# Patient Record
Sex: FEMALE | Race: WHITE | NOT HISPANIC OR LATINO | Employment: UNEMPLOYED | URBAN - NONMETROPOLITAN AREA
[De-identification: names, ages, dates, MRNs, and addresses within clinical notes are randomized per-mention and may not be internally consistent; named-entity substitution may affect disease eponyms.]

---

## 2018-03-26 LAB
ALBUMIN SERPL-MCNC: 3.5 G/DL (ref 3.5–5.2)
ALP BLD-CCNC: 82 U/L (ref 35–104)
ALT SERPL-CCNC: 261 U/L (ref 5–33)
ANION GAP SERPL CALCULATED.3IONS-SCNC: 13 MMOL/L (ref 7–19)
AST SERPL-CCNC: 146 U/L (ref 5–32)
BACTERIA: ABNORMAL /HPF
BASOPHILS ABSOLUTE: 0.1 K/UL (ref 0–0.2)
BASOPHILS RELATIVE PERCENT: 1.4 % (ref 0–1)
BILIRUB SERPL-MCNC: 0.5 MG/DL (ref 0.2–1.2)
BILIRUBIN URINE: NEGATIVE
BLOOD, URINE: ABNORMAL
BUN BLDV-MCNC: 11 MG/DL (ref 6–20)
CALCIUM SERPL-MCNC: 9.6 MG/DL (ref 8.6–10)
CHLORIDE BLD-SCNC: 98 MMOL/L (ref 98–111)
CLARITY: ABNORMAL
CO2: 26 MMOL/L (ref 22–29)
COLOR: YELLOW
CREAT SERPL-MCNC: 0.6 MG/DL (ref 0.5–0.9)
CREATININE URINE: 112.4 MG/DL (ref 4.2–622)
EOSINOPHILS ABSOLUTE: 0.4 K/UL (ref 0–0.6)
EOSINOPHILS RELATIVE PERCENT: 7.5 % (ref 0–5)
EPITHELIAL CELLS, UA: 5 /HPF (ref 0–5)
GFR NON-AFRICAN AMERICAN: >60
GLUCOSE BLD-MCNC: 81 MG/DL (ref 74–109)
GLUCOSE URINE: NEGATIVE MG/DL
HCT VFR BLD CALC: 40.9 % (ref 37–47)
HEMOGLOBIN: 13 G/DL (ref 12–16)
HYALINE CASTS: 8 /HPF (ref 0–8)
KETONES, URINE: NEGATIVE MG/DL
LEUKOCYTE ESTERASE, URINE: ABNORMAL
LYMPHOCYTES ABSOLUTE: 0.6 K/UL (ref 1.1–4.5)
LYMPHOCYTES RELATIVE PERCENT: 12.4 % (ref 20–40)
MCH RBC QN AUTO: 26.7 PG (ref 27–31)
MCHC RBC AUTO-ENTMCNC: 31.8 G/DL (ref 33–37)
MCV RBC AUTO: 84 FL (ref 81–99)
MONOCYTES ABSOLUTE: 0.9 K/UL (ref 0–0.9)
MONOCYTES RELATIVE PERCENT: 17.1 % (ref 0–10)
NEUTROPHILS ABSOLUTE: 3.1 K/UL (ref 1.5–7.5)
NEUTROPHILS RELATIVE PERCENT: 61.4 % (ref 50–65)
NITRITE, URINE: NEGATIVE
PDW BLD-RTO: 13.6 % (ref 11.5–14.5)
PH UA: 6
PLATELET # BLD: 250 K/UL (ref 130–400)
PMV BLD AUTO: 9.5 FL (ref 9.4–12.3)
POTASSIUM SERPL-SCNC: 3.9 MMOL/L (ref 3.5–5)
PROTEIN PROTEIN: 49 MG/DL (ref 15–45)
PROTEIN UA: 30 MG/DL
RBC # BLD: 4.87 M/UL (ref 4.2–5.4)
RBC UA: 21 /HPF (ref 0–4)
SODIUM BLD-SCNC: 137 MMOL/L (ref 136–145)
SPECIFIC GRAVITY UA: 1.02
TOTAL PROTEIN: 7.8 G/DL (ref 6.6–8.7)
UROBILINOGEN, URINE: 0.2 E.U./DL
WBC # BLD: 5.1 K/UL (ref 4.8–10.8)
WBC UA: 12 /HPF (ref 0–5)

## 2020-05-09 ENCOUNTER — HOSPITAL ENCOUNTER (INPATIENT)
Facility: HOSPITAL | Age: 50
LOS: 1 days | Discharge: ANOTHER HOSPITAL | End: 2020-05-10
Attending: FAMILY MEDICINE | Admitting: INTERNAL MEDICINE

## 2020-05-09 DIAGNOSIS — D64.9 SYMPTOMATIC ANEMIA: ICD-10-CM

## 2020-05-09 DIAGNOSIS — I50.9 CONGESTIVE HEART FAILURE, UNSPECIFIED HF CHRONICITY, UNSPECIFIED HEART FAILURE TYPE (HCC): Primary | ICD-10-CM

## 2020-05-09 DIAGNOSIS — N17.9 AKI (ACUTE KIDNEY INJURY) (HCC): ICD-10-CM

## 2020-05-09 PROCEDURE — 99285 EMERGENCY DEPT VISIT HI MDM: CPT

## 2020-05-10 ENCOUNTER — APPOINTMENT (OUTPATIENT)
Dept: NUCLEAR MEDICINE | Facility: HOSPITAL | Age: 50
End: 2020-05-10

## 2020-05-10 ENCOUNTER — APPOINTMENT (OUTPATIENT)
Dept: CARDIOLOGY | Facility: HOSPITAL | Age: 50
End: 2020-05-10

## 2020-05-10 ENCOUNTER — APPOINTMENT (OUTPATIENT)
Dept: ULTRASOUND IMAGING | Facility: HOSPITAL | Age: 50
End: 2020-05-10

## 2020-05-10 ENCOUNTER — APPOINTMENT (OUTPATIENT)
Dept: CT IMAGING | Facility: HOSPITAL | Age: 50
End: 2020-05-10

## 2020-05-10 ENCOUNTER — APPOINTMENT (OUTPATIENT)
Dept: GENERAL RADIOLOGY | Facility: HOSPITAL | Age: 50
End: 2020-05-10

## 2020-05-10 VITALS
HEIGHT: 63 IN | DIASTOLIC BLOOD PRESSURE: 66 MMHG | BODY MASS INDEX: 33.08 KG/M2 | TEMPERATURE: 98.3 F | RESPIRATION RATE: 24 BRPM | SYSTOLIC BLOOD PRESSURE: 131 MMHG | WEIGHT: 186.7 LBS | HEART RATE: 91 BPM | OXYGEN SATURATION: 94 %

## 2020-05-10 PROBLEM — E61.1 IRON DEFICIENCY: Status: ACTIVE | Noted: 2020-05-10

## 2020-05-10 PROBLEM — A49.9 UTI (URINARY TRACT INFECTION), BACTERIAL: Status: ACTIVE | Noted: 2020-05-10

## 2020-05-10 PROBLEM — N39.0 UTI (URINARY TRACT INFECTION), BACTERIAL: Status: ACTIVE | Noted: 2020-05-10

## 2020-05-10 PROBLEM — D64.9 ANEMIA: Status: ACTIVE | Noted: 2020-05-10

## 2020-05-10 PROBLEM — B17.9 ACUTE HEPATITIS: Status: ACTIVE | Noted: 2020-05-10

## 2020-05-10 PROBLEM — F41.9 ANXIETY: Status: ACTIVE | Noted: 2020-05-10

## 2020-05-10 PROBLEM — M06.9 RHEUMATOID ARTHRITIS (HCC): Status: ACTIVE | Noted: 2020-05-10

## 2020-05-10 PROBLEM — E87.70 VOLUME OVERLOAD: Status: ACTIVE | Noted: 2020-05-10

## 2020-05-10 PROBLEM — G47.33 OBSTRUCTIVE SLEEP APNEA SYNDROME: Status: ACTIVE | Noted: 2020-05-10

## 2020-05-10 PROBLEM — G25.81 RESTLESS LEGS: Status: ACTIVE | Noted: 2020-05-10

## 2020-05-10 PROBLEM — M35.00 SJOGREN'S SYNDROME (HCC): Chronic | Status: ACTIVE | Noted: 2020-05-10

## 2020-05-10 PROBLEM — J96.01 ACUTE RESPIRATORY FAILURE WITH HYPOXIA (HCC): Status: ACTIVE | Noted: 2020-05-10

## 2020-05-10 PROBLEM — E87.6 HYPOKALEMIA: Status: ACTIVE | Noted: 2020-05-10

## 2020-05-10 PROBLEM — R60.9 EDEMA: Status: ACTIVE | Noted: 2020-05-10

## 2020-05-10 PROBLEM — I50.9 CONGESTIVE HEART FAILURE (HCC): Status: ACTIVE | Noted: 2020-05-10

## 2020-05-10 PROBLEM — N17.9 AKI (ACUTE KIDNEY INJURY) (HCC): Status: ACTIVE | Noted: 2020-05-10

## 2020-05-10 LAB
A-A DO2: 157.7 MMHG
A-A DO2: 69.1 MMHG
ABO GROUP BLD: NORMAL
ALBUMIN SERPL-MCNC: 2.7 G/DL (ref 3.5–5.2)
ALBUMIN/GLOB SERPL: 0.6 G/DL
ALP SERPL-CCNC: 69 U/L (ref 39–117)
ALT SERPL W P-5'-P-CCNC: 7 U/L (ref 1–33)
AMYLASE SERPL-CCNC: 47 U/L (ref 28–100)
ANION GAP SERPL CALCULATED.3IONS-SCNC: 19 MMOL/L (ref 5–15)
ANION GAP SERPL CALCULATED.3IONS-SCNC: 20 MMOL/L (ref 5–15)
ANION GAP SERPL CALCULATED.3IONS-SCNC: 20 MMOL/L (ref 5–15)
APTT PPP: 25.6 SECONDS (ref 24.1–35)
ARTERIAL PATENCY WRIST A: POSITIVE
ARTERIAL PATENCY WRIST A: POSITIVE
AST SERPL-CCNC: 15 U/L (ref 1–32)
ATMOSPHERIC PRESS: 755 MMHG
ATMOSPHERIC PRESS: 756 MMHG
BASE EXCESS BLDA CALC-SCNC: -2.8 MMOL/L (ref 0–2)
BASE EXCESS BLDA CALC-SCNC: -3.5 MMOL/L (ref 0–2)
BASOPHILS # BLD MANUAL: 0.16 10*3/MM3 (ref 0–0.2)
BASOPHILS NFR BLD AUTO: 1 % (ref 0–1.5)
BDY SITE: ABNORMAL
BDY SITE: ABNORMAL
BH CV ECHO MEAS - AO MAX PG (FULL): 1.3 MMHG
BH CV ECHO MEAS - AO MAX PG: 4.7 MMHG
BH CV ECHO MEAS - AO MEAN PG (FULL): 0 MMHG
BH CV ECHO MEAS - AO MEAN PG: 2 MMHG
BH CV ECHO MEAS - AO ROOT AREA (BSA CORRECTED): 1.5
BH CV ECHO MEAS - AO ROOT AREA: 6.2 CM^2
BH CV ECHO MEAS - AO ROOT DIAM: 2.8 CM
BH CV ECHO MEAS - AO V2 MAX: 108 CM/SEC
BH CV ECHO MEAS - AO V2 MEAN: 57.5 CM/SEC
BH CV ECHO MEAS - AO V2 VTI: 17.4 CM
BH CV ECHO MEAS - AVA(I,A): 3 CM^2
BH CV ECHO MEAS - AVA(I,D): 3 CM^2
BH CV ECHO MEAS - AVA(V,A): 2.9 CM^2
BH CV ECHO MEAS - AVA(V,D): 2.9 CM^2
BH CV ECHO MEAS - BSA(HAYCOCK): 2 M^2
BH CV ECHO MEAS - BSA: 1.9 M^2
BH CV ECHO MEAS - BZI_BMI: 32.9 KILOGRAMS/M^2
BH CV ECHO MEAS - BZI_METRIC_HEIGHT: 160 CM
BH CV ECHO MEAS - BZI_METRIC_WEIGHT: 84.4 KG
BH CV ECHO MEAS - EDV(CUBED): 156.6 ML
BH CV ECHO MEAS - EDV(MOD-SP4): 93.3 ML
BH CV ECHO MEAS - EDV(TEICH): 140.7 ML
BH CV ECHO MEAS - EF(CUBED): 60.3 %
BH CV ECHO MEAS - EF(MOD-SP4): 51.2 %
BH CV ECHO MEAS - EF(TEICH): 51.4 %
BH CV ECHO MEAS - ESV(CUBED): 62.1 ML
BH CV ECHO MEAS - ESV(MOD-SP4): 45.5 ML
BH CV ECHO MEAS - ESV(TEICH): 68.3 ML
BH CV ECHO MEAS - FS: 26.5 %
BH CV ECHO MEAS - IVS/LVPW: 1
BH CV ECHO MEAS - IVSD: 1.3 CM
BH CV ECHO MEAS - LA DIMENSION: 4.3 CM
BH CV ECHO MEAS - LA/AO: 1.5
BH CV ECHO MEAS - LV DIASTOLIC VOL/BSA (35-75): 49.8 ML/M^2
BH CV ECHO MEAS - LV MASS(C)D: 299.5 GRAMS
BH CV ECHO MEAS - LV MASS(C)DI: 159.7 GRAMS/M^2
BH CV ECHO MEAS - LV MAX PG: 3.4 MMHG
BH CV ECHO MEAS - LV MEAN PG: 2 MMHG
BH CV ECHO MEAS - LV SYSTOLIC VOL/BSA (12-30): 24.3 ML/M^2
BH CV ECHO MEAS - LV V1 MAX: 91.8 CM/SEC
BH CV ECHO MEAS - LV V1 MEAN: 58.4 CM/SEC
BH CV ECHO MEAS - LV V1 VTI: 14.9 CM
BH CV ECHO MEAS - LVIDD: 5.4 CM
BH CV ECHO MEAS - LVIDS: 4 CM
BH CV ECHO MEAS - LVLD AP4: 7.4 CM
BH CV ECHO MEAS - LVLS AP4: 6.7 CM
BH CV ECHO MEAS - LVOT AREA (M): 3.5 CM^2
BH CV ECHO MEAS - LVOT AREA: 3.5 CM^2
BH CV ECHO MEAS - LVOT DIAM: 2.1 CM
BH CV ECHO MEAS - LVPWD: 1.3 CM
BH CV ECHO MEAS - MR MAX PG: 136.9 MMHG
BH CV ECHO MEAS - MR MAX VEL: 585 CM/SEC
BH CV ECHO MEAS - MR MEAN PG: 89.5 MMHG
BH CV ECHO MEAS - MR MEAN VEL: 432 CM/SEC
BH CV ECHO MEAS - MR VTI: 168.5 CM
BH CV ECHO MEAS - MV A MAX VEL: 31 CM/SEC
BH CV ECHO MEAS - MV DEC TIME: 0.15 SEC
BH CV ECHO MEAS - MV E MAX VEL: 119 CM/SEC
BH CV ECHO MEAS - MV E/A: 3.8
BH CV ECHO MEAS - RVDD: 3.1 CM
BH CV ECHO MEAS - SI(AO): 57.1 ML/M^2
BH CV ECHO MEAS - SI(CUBED): 50.4 ML/M^2
BH CV ECHO MEAS - SI(LVOT): 27.5 ML/M^2
BH CV ECHO MEAS - SI(MOD-SP4): 25.5 ML/M^2
BH CV ECHO MEAS - SI(TEICH): 38.6 ML/M^2
BH CV ECHO MEAS - SV(AO): 107.1 ML
BH CV ECHO MEAS - SV(CUBED): 94.5 ML
BH CV ECHO MEAS - SV(LVOT): 51.6 ML
BH CV ECHO MEAS - SV(MOD-SP4): 47.8 ML
BH CV ECHO MEAS - SV(TEICH): 72.4 ML
BILIRUB SERPL-MCNC: 0.6 MG/DL (ref 0.2–1.2)
BLD GP AB SCN SERPL QL: NEGATIVE
BODY TEMPERATURE: 37 C
BODY TEMPERATURE: 37 C
BUN BLD-MCNC: 70 MG/DL (ref 6–20)
BUN BLD-MCNC: 71 MG/DL (ref 6–20)
BUN BLD-MCNC: 75 MG/DL (ref 6–20)
BUN/CREAT SERPL: 10.7 (ref 7–25)
BUN/CREAT SERPL: 10.8 (ref 7–25)
BUN/CREAT SERPL: 11.1 (ref 7–25)
C3 FRG RBC-MCNC: ABNORMAL
C3 SERPL-MCNC: 143 MG/DL (ref 82–167)
C4 SERPL-MCNC: 3 MG/DL (ref 14–44)
CALCIUM SPEC-SCNC: 9.1 MG/DL (ref 8.6–10.5)
CALCIUM SPEC-SCNC: 9.2 MG/DL (ref 8.6–10.5)
CALCIUM SPEC-SCNC: 9.2 MG/DL (ref 8.6–10.5)
CHLORIDE SERPL-SCNC: 96 MMOL/L (ref 98–107)
CHLORIDE SERPL-SCNC: 97 MMOL/L (ref 98–107)
CHLORIDE SERPL-SCNC: 99 MMOL/L (ref 98–107)
CHOLEST SERPL-MCNC: 191 MG/DL (ref 0–200)
CHROMATIN AB SERPL-ACNC: 55.4 IU/ML (ref 0–14)
CO2 SERPL-SCNC: 16 MMOL/L (ref 22–29)
CO2 SERPL-SCNC: 18 MMOL/L (ref 22–29)
CO2 SERPL-SCNC: 20 MMOL/L (ref 22–29)
COHGB MFR BLD: 1.4 % (ref 0–5)
COHGB MFR BLD: 12.7 % (ref 0–5)
CREAT BLD-MCNC: 6.49 MG/DL (ref 0.57–1)
CREAT BLD-MCNC: 6.62 MG/DL (ref 0.57–1)
CREAT BLD-MCNC: 6.78 MG/DL (ref 0.57–1)
CREAT UR-MCNC: 56.6 MG/DL
CRP SERPL-MCNC: 16.36 MG/DL (ref 0–0.5)
D DIMER PPP FEU-MCNC: 5.73 MG/L (FEU) (ref 0–0.5)
DEPRECATED RDW RBC AUTO: 43 FL (ref 37–54)
EOSINOPHIL # BLD MANUAL: 0.33 10*3/MM3 (ref 0–0.4)
EOSINOPHIL NFR BLD MANUAL: 2 % (ref 0.3–6.2)
EOSINOPHIL SPEC QL MICRO: 3 % EOS/100 CELLS (ref 0–0)
EPAP: 6
ERYTHROCYTE [DISTWIDTH] IN BLOOD BY AUTOMATED COUNT: 16.8 % (ref 12.3–15.4)
FERRITIN SERPL-MCNC: 256.9 NG/ML (ref 13–150)
FOLATE SERPL-MCNC: 6.33 NG/ML (ref 4.78–24.2)
GFR SERPL CREATININE-BSD FRML MDRD: 6 ML/MIN/1.73
GFR SERPL CREATININE-BSD FRML MDRD: 7 ML/MIN/1.73
GFR SERPL CREATININE-BSD FRML MDRD: 7 ML/MIN/1.73
GFR SERPL CREATININE-BSD FRML MDRD: ABNORMAL ML/MIN/{1.73_M2}
GLOBULIN UR ELPH-MCNC: 4.2 GM/DL
GLUCOSE BLD-MCNC: 116 MG/DL (ref 65–99)
GLUCOSE BLD-MCNC: 121 MG/DL (ref 65–99)
GLUCOSE BLD-MCNC: 99 MG/DL (ref 65–99)
HCO3 BLDA-SCNC: 18.8 MMOL/L (ref 20–26)
HCO3 BLDA-SCNC: 21.2 MMOL/L (ref 20–26)
HCT VFR BLD AUTO: 21.7 % (ref 34–46.6)
HCT VFR BLD AUTO: 22 % (ref 34–46.6)
HCT VFR BLD CALC: 22.8 % (ref 38–51)
HCT VFR BLD CALC: 23.2 % (ref 38–51)
HDLC SERPL-MCNC: 40 MG/DL (ref 40–60)
HGB BLD-MCNC: 7.1 G/DL (ref 12–15.9)
HGB BLD-MCNC: 7.1 G/DL (ref 12–15.9)
HGB BLDA-MCNC: 7.4 G/DL (ref 12–16)
HGB BLDA-MCNC: 7.6 G/DL (ref 12–16)
HOROWITZ INDEX BLD+IHG-RTO: 21 %
HOROWITZ INDEX BLD+IHG-RTO: 40 %
INR PPP: 1.34 (ref 0.91–1.09)
IPAP: 12
IRON 24H UR-MRATE: 29 MCG/DL (ref 37–145)
IRON SATN MFR SERPL: 9 % (ref 20–50)
LDLC SERPL CALC-MCNC: 118 MG/DL (ref 0–100)
LDLC/HDLC SERPL: 2.95 {RATIO}
LIPASE SERPL-CCNC: 43 U/L (ref 13–60)
LYMPHOCYTES # BLD MANUAL: 1.79 10*3/MM3 (ref 0.7–3.1)
LYMPHOCYTES NFR BLD MANUAL: 11 % (ref 19.6–45.3)
LYMPHOCYTES NFR BLD MANUAL: 3 % (ref 5–12)
Lab: ABNORMAL
MAGNESIUM SERPL-MCNC: 1.9 MG/DL (ref 1.6–2.6)
MAXIMAL PREDICTED HEART RATE: 171 BPM
MCH RBC QN AUTO: 23.2 PG (ref 26.6–33)
MCHC RBC AUTO-ENTMCNC: 32.3 G/DL (ref 31.5–35.7)
MCV RBC AUTO: 71.9 FL (ref 79–97)
METHGB BLD QL: 0.2 % (ref 0–3)
METHGB BLD QL: 0.3 % (ref 0–3)
MICROCYTES BLD QL: ABNORMAL
MODALITY: ABNORMAL
MODALITY: ABNORMAL
MONOCYTES # BLD AUTO: 0.49 10*3/MM3 (ref 0.1–0.9)
NEUTROPHILS # BLD AUTO: 13.36 10*3/MM3 (ref 1.7–7)
NEUTROPHILS NFR BLD MANUAL: 82 % (ref 42.7–76)
NOTE: ABNORMAL
NOTE: ABNORMAL
NOTIFIED BY: ABNORMAL
NOTIFIED WHO: ABNORMAL
NRBC SPEC MANUAL: 1 /100 WBC (ref 0–0.2)
NT-PROBNP SERPL-MCNC: ABNORMAL PG/ML (ref 5–450)
OSMOLALITY UR: 342 MOSM/KG (ref 601–850)
OXYHGB MFR BLDV: 79.9 % (ref 94–99)
OXYHGB MFR BLDV: 95.8 % (ref 94–99)
PCO2 BLDA: 23.6 MM HG (ref 35–45)
PCO2 BLDA: 32.3 MM HG (ref 35–45)
PCO2 TEMP ADJ BLD: ABNORMAL MM[HG]
PCO2 TEMP ADJ BLD: ABNORMAL MM[HG]
PH BLDA: 7.43 PH UNITS (ref 7.35–7.45)
PH BLDA: 7.51 PH UNITS (ref 7.35–7.45)
PH, TEMP CORRECTED: ABNORMAL
PH, TEMP CORRECTED: ABNORMAL
PHOSPHATE SERPL-MCNC: 5.9 MG/DL (ref 2.5–4.5)
PLAT MORPH BLD: NORMAL
PLATELET # BLD AUTO: 361 10*3/MM3 (ref 140–450)
PMV BLD AUTO: 10 FL (ref 6–12)
PO2 BLDA: 53.1 MM HG (ref 83–108)
PO2 BLDA: 90 MM HG (ref 83–108)
PO2 TEMP ADJ BLD: ABNORMAL MM[HG]
PO2 TEMP ADJ BLD: ABNORMAL MM[HG]
POLYCHROMASIA BLD QL SMEAR: ABNORMAL
POTASSIUM BLD-SCNC: 3.1 MMOL/L (ref 3.5–5.2)
POTASSIUM BLD-SCNC: 3.3 MMOL/L (ref 3.5–5.2)
POTASSIUM BLD-SCNC: 4 MMOL/L (ref 3.5–5.2)
POTASSIUM BLDA-SCNC: 3.2 MMOL/L (ref 3.5–5.2)
POTASSIUM BLDA-SCNC: 3.3 MMOL/L (ref 3.5–5.2)
PROT SERPL-MCNC: 6.9 G/DL (ref 6–8.5)
PROT UR-MCNC: 206.1 MG/DL
PROTHROMBIN TIME: 16.2 SECONDS (ref 11.9–14.6)
RBC # BLD AUTO: 3.06 10*6/MM3 (ref 3.77–5.28)
RETICS # AUTO: 0.08 10*6/MM3 (ref 0.02–0.13)
RETICS/RBC NFR AUTO: 2.51 % (ref 0.7–1.9)
RH BLD: NEGATIVE
ROULEAUX BLD QL SMEAR: ABNORMAL
SAO2 % BLDCOA: 91.8 % (ref 94–99)
SAO2 % BLDCOA: 97.5 % (ref 94–99)
SARS-COV-2 RNA RESP QL NAA+PROBE: NOT DETECTED
SET MECH RESP RATE: 16
SODIUM BLD-SCNC: 135 MMOL/L (ref 136–145)
SODIUM BLDA-SCNC: 136 MMOL/L (ref 136–145)
SODIUM BLDA-SCNC: 137 MMOL/L (ref 136–145)
SODIUM UR-SCNC: 72 MMOL/L
STRESS TARGET HR: 145 BPM
T&S EXPIRATION DATE: NORMAL
T4 FREE SERPL-MCNC: 1.2 NG/DL (ref 0.93–1.7)
TIBC SERPL-MCNC: 328 MCG/DL (ref 298–536)
TRANSFERRIN SERPL-MCNC: 220 MG/DL (ref 200–360)
TRIGL SERPL-MCNC: 165 MG/DL (ref 0–150)
TROPONIN T SERPL-MCNC: 0.12 NG/ML (ref 0–0.03)
TROPONIN T SERPL-MCNC: 0.12 NG/ML (ref 0–0.03)
TSH SERPL DL<=0.05 MIU/L-ACNC: 4.59 UIU/ML (ref 0.27–4.2)
VARIANT LYMPHS NFR BLD MANUAL: 1 % (ref 0–5)
VENTILATOR MODE: ABNORMAL
VENTILATOR MODE: ABNORMAL
VIT B12 BLD-MCNC: 1587 PG/ML (ref 211–946)
VLDLC SERPL-MCNC: 33 MG/DL
WBC MORPH BLD: NORMAL
WBC NRBC COR # BLD: 16.29 10*3/MM3 (ref 3.4–10.8)

## 2020-05-10 PROCEDURE — 82570 ASSAY OF URINE CREATININE: CPT | Performed by: INTERNAL MEDICINE

## 2020-05-10 PROCEDURE — 84466 ASSAY OF TRANSFERRIN: CPT | Performed by: INTERNAL MEDICINE

## 2020-05-10 PROCEDURE — 93306 TTE W/DOPPLER COMPLETE: CPT

## 2020-05-10 PROCEDURE — 85018 HEMOGLOBIN: CPT | Performed by: INTERNAL MEDICINE

## 2020-05-10 PROCEDURE — 93005 ELECTROCARDIOGRAM TRACING: CPT | Performed by: INTERNAL MEDICINE

## 2020-05-10 PROCEDURE — 83540 ASSAY OF IRON: CPT | Performed by: INTERNAL MEDICINE

## 2020-05-10 PROCEDURE — 83735 ASSAY OF MAGNESIUM: CPT | Performed by: INTERNAL MEDICINE

## 2020-05-10 PROCEDURE — 86060 ANTISTREPTOLYSIN O TITER: CPT | Performed by: INTERNAL MEDICINE

## 2020-05-10 PROCEDURE — 84100 ASSAY OF PHOSPHORUS: CPT | Performed by: INTERNAL MEDICINE

## 2020-05-10 PROCEDURE — 84300 ASSAY OF URINE SODIUM: CPT | Performed by: INTERNAL MEDICINE

## 2020-05-10 PROCEDURE — 94799 UNLISTED PULMONARY SVC/PX: CPT

## 2020-05-10 PROCEDURE — 93005 ELECTROCARDIOGRAM TRACING: CPT | Performed by: FAMILY MEDICINE

## 2020-05-10 PROCEDURE — 86901 BLOOD TYPING SEROLOGIC RH(D): CPT | Performed by: FAMILY MEDICINE

## 2020-05-10 PROCEDURE — 25010000002 IRON SUCROSE PER 1 MG: Performed by: INTERNAL MEDICINE

## 2020-05-10 PROCEDURE — 82150 ASSAY OF AMYLASE: CPT | Performed by: INTERNAL MEDICINE

## 2020-05-10 PROCEDURE — 25010000002 ONDANSETRON PER 1 MG: Performed by: FAMILY MEDICINE

## 2020-05-10 PROCEDURE — 86225 DNA ANTIBODY NATIVE: CPT | Performed by: INTERNAL MEDICINE

## 2020-05-10 PROCEDURE — 82805 BLOOD GASES W/O2 SATURATION: CPT

## 2020-05-10 PROCEDURE — 87635 SARS-COV-2 COVID-19 AMP PRB: CPT | Performed by: FAMILY MEDICINE

## 2020-05-10 PROCEDURE — 93970 EXTREMITY STUDY: CPT | Performed by: SURGERY

## 2020-05-10 PROCEDURE — 85007 BL SMEAR W/DIFF WBC COUNT: CPT | Performed by: FAMILY MEDICINE

## 2020-05-10 PROCEDURE — 83690 ASSAY OF LIPASE: CPT | Performed by: INTERNAL MEDICINE

## 2020-05-10 PROCEDURE — 80048 BASIC METABOLIC PNL TOTAL CA: CPT | Performed by: INTERNAL MEDICINE

## 2020-05-10 PROCEDURE — 82746 ASSAY OF FOLIC ACID SERUM: CPT | Performed by: INTERNAL MEDICINE

## 2020-05-10 PROCEDURE — 86160 COMPLEMENT ANTIGEN: CPT | Performed by: INTERNAL MEDICINE

## 2020-05-10 PROCEDURE — 84156 ASSAY OF PROTEIN URINE: CPT | Performed by: INTERNAL MEDICINE

## 2020-05-10 PROCEDURE — 71045 X-RAY EXAM CHEST 1 VIEW: CPT

## 2020-05-10 PROCEDURE — 85730 THROMBOPLASTIN TIME PARTIAL: CPT | Performed by: FAMILY MEDICINE

## 2020-05-10 PROCEDURE — 80050 GENERAL HEALTH PANEL: CPT | Performed by: FAMILY MEDICINE

## 2020-05-10 PROCEDURE — 25010000002 CEFTRIAXONE PER 250 MG: Performed by: INTERNAL MEDICINE

## 2020-05-10 PROCEDURE — 80061 LIPID PANEL: CPT | Performed by: INTERNAL MEDICINE

## 2020-05-10 PROCEDURE — 84165 PROTEIN E-PHORESIS SERUM: CPT | Performed by: INTERNAL MEDICINE

## 2020-05-10 PROCEDURE — 05HC33Z INSERTION OF INFUSION DEVICE INTO LEFT BASILIC VEIN, PERCUTANEOUS APPROACH: ICD-10-PCS | Performed by: INTERNAL MEDICINE

## 2020-05-10 PROCEDURE — 36600 WITHDRAWAL OF ARTERIAL BLOOD: CPT

## 2020-05-10 PROCEDURE — 86850 RBC ANTIBODY SCREEN: CPT | Performed by: FAMILY MEDICINE

## 2020-05-10 PROCEDURE — 76775 US EXAM ABDO BACK WALL LIM: CPT

## 2020-05-10 PROCEDURE — 71250 CT THORAX DX C-: CPT

## 2020-05-10 PROCEDURE — 84484 ASSAY OF TROPONIN QUANT: CPT | Performed by: FAMILY MEDICINE

## 2020-05-10 PROCEDURE — 85045 AUTOMATED RETICULOCYTE COUNT: CPT | Performed by: INTERNAL MEDICINE

## 2020-05-10 PROCEDURE — 86431 RHEUMATOID FACTOR QUANT: CPT | Performed by: INTERNAL MEDICINE

## 2020-05-10 PROCEDURE — 83880 ASSAY OF NATRIURETIC PEPTIDE: CPT | Performed by: FAMILY MEDICINE

## 2020-05-10 PROCEDURE — 83935 ASSAY OF URINE OSMOLALITY: CPT | Performed by: INTERNAL MEDICINE

## 2020-05-10 PROCEDURE — 25010000002 FUROSEMIDE PER 20 MG: Performed by: FAMILY MEDICINE

## 2020-05-10 PROCEDURE — 86038 ANTINUCLEAR ANTIBODIES: CPT | Performed by: INTERNAL MEDICINE

## 2020-05-10 PROCEDURE — 93010 ELECTROCARDIOGRAM REPORT: CPT | Performed by: INTERNAL MEDICINE

## 2020-05-10 PROCEDURE — 87205 SMEAR GRAM STAIN: CPT | Performed by: INTERNAL MEDICINE

## 2020-05-10 PROCEDURE — 93970 EXTREMITY STUDY: CPT

## 2020-05-10 PROCEDURE — 82728 ASSAY OF FERRITIN: CPT | Performed by: INTERNAL MEDICINE

## 2020-05-10 PROCEDURE — 94660 CPAP INITIATION&MGMT: CPT

## 2020-05-10 PROCEDURE — 83050 HGB METHEMOGLOBIN QUAN: CPT

## 2020-05-10 PROCEDURE — 85610 PROTHROMBIN TIME: CPT | Performed by: FAMILY MEDICINE

## 2020-05-10 PROCEDURE — 36410 VNPNXR 3YR/> PHY/QHP DX/THER: CPT

## 2020-05-10 PROCEDURE — 84439 ASSAY OF FREE THYROXINE: CPT | Performed by: INTERNAL MEDICINE

## 2020-05-10 PROCEDURE — 85014 HEMATOCRIT: CPT | Performed by: INTERNAL MEDICINE

## 2020-05-10 PROCEDURE — 82375 ASSAY CARBOXYHB QUANT: CPT

## 2020-05-10 PROCEDURE — 86140 C-REACTIVE PROTEIN: CPT | Performed by: INTERNAL MEDICINE

## 2020-05-10 PROCEDURE — 93306 TTE W/DOPPLER COMPLETE: CPT | Performed by: INTERNAL MEDICINE

## 2020-05-10 PROCEDURE — 86900 BLOOD TYPING SEROLOGIC ABO: CPT | Performed by: FAMILY MEDICINE

## 2020-05-10 PROCEDURE — 82607 VITAMIN B-12: CPT | Performed by: INTERNAL MEDICINE

## 2020-05-10 PROCEDURE — 83516 IMMUNOASSAY NONANTIBODY: CPT | Performed by: INTERNAL MEDICINE

## 2020-05-10 PROCEDURE — C1751 CATH, INF, PER/CENT/MIDLINE: HCPCS

## 2020-05-10 PROCEDURE — 85379 FIBRIN DEGRADATION QUANT: CPT | Performed by: FAMILY MEDICINE

## 2020-05-10 PROCEDURE — 84155 ASSAY OF PROTEIN SERUM: CPT | Performed by: INTERNAL MEDICINE

## 2020-05-10 RX ORDER — ROPINIROLE 1 MG/1
1 TABLET, FILM COATED ORAL DAILY
Status: DISCONTINUED | OUTPATIENT
Start: 2020-05-10 | End: 2020-05-10 | Stop reason: HOSPADM

## 2020-05-10 RX ORDER — LISINOPRIL 10 MG/1
10 TABLET ORAL ONCE
Status: COMPLETED | OUTPATIENT
Start: 2020-05-10 | End: 2020-05-10

## 2020-05-10 RX ORDER — ACETAMINOPHEN 325 MG/1
650 TABLET ORAL EVERY 4 HOURS PRN
Start: 2020-05-10

## 2020-05-10 RX ORDER — RIZATRIPTAN BENZOATE 10 MG/1
10 TABLET ORAL ONCE AS NEEDED
COMMUNITY
End: 2020-05-10 | Stop reason: HOSPADM

## 2020-05-10 RX ORDER — METOPROLOL SUCCINATE 50 MG/1
50 TABLET, EXTENDED RELEASE ORAL DAILY
Status: DISCONTINUED | OUTPATIENT
Start: 2020-05-10 | End: 2020-05-10 | Stop reason: HOSPADM

## 2020-05-10 RX ORDER — METOPROLOL TARTRATE 5 MG/5ML
5 INJECTION INTRAVENOUS EVERY 6 HOURS PRN
Qty: 15 ML
Start: 2020-05-10

## 2020-05-10 RX ORDER — POTASSIUM CHLORIDE 750 MG/1
40 CAPSULE, EXTENDED RELEASE ORAL ONCE
Status: COMPLETED | OUTPATIENT
Start: 2020-05-10 | End: 2020-05-10

## 2020-05-10 RX ORDER — FAMOTIDINE 40 MG/1
40 TABLET, FILM COATED ORAL 2 TIMES DAILY
COMMUNITY
End: 2020-05-10 | Stop reason: HOSPADM

## 2020-05-10 RX ORDER — METOPROLOL SUCCINATE 50 MG/1
50 TABLET, EXTENDED RELEASE ORAL DAILY
COMMUNITY

## 2020-05-10 RX ORDER — SODIUM CHLORIDE 0.9 % (FLUSH) 0.9 %
10 SYRINGE (ML) INJECTION AS NEEDED
Status: DISCONTINUED | OUTPATIENT
Start: 2020-05-10 | End: 2020-05-10 | Stop reason: HOSPADM

## 2020-05-10 RX ORDER — FUROSEMIDE 10 MG/ML
40 INJECTION INTRAMUSCULAR; INTRAVENOUS ONCE
Status: COMPLETED | OUTPATIENT
Start: 2020-05-10 | End: 2020-05-10

## 2020-05-10 RX ORDER — ONDANSETRON 2 MG/ML
4 INJECTION INTRAMUSCULAR; INTRAVENOUS EVERY 6 HOURS PRN
Start: 2020-05-10

## 2020-05-10 RX ORDER — CETIRIZINE HYDROCHLORIDE 5 MG/1
5 TABLET ORAL DAILY
Start: 2020-05-11

## 2020-05-10 RX ORDER — ACETAMINOPHEN 325 MG/1
650 TABLET ORAL EVERY 4 HOURS PRN
Status: DISCONTINUED | OUTPATIENT
Start: 2020-05-10 | End: 2020-05-10 | Stop reason: HOSPADM

## 2020-05-10 RX ORDER — FUROSEMIDE 10 MG/ML
60 INJECTION INTRAMUSCULAR; INTRAVENOUS EVERY 12 HOURS
Status: DISCONTINUED | OUTPATIENT
Start: 2020-05-10 | End: 2020-05-10

## 2020-05-10 RX ORDER — AMITRIPTYLINE HYDROCHLORIDE 25 MG/1
25 TABLET, FILM COATED ORAL DAILY
COMMUNITY

## 2020-05-10 RX ORDER — ONDANSETRON 4 MG/1
4 TABLET, FILM COATED ORAL EVERY 6 HOURS PRN
Status: DISCONTINUED | OUTPATIENT
Start: 2020-05-10 | End: 2020-05-10 | Stop reason: HOSPADM

## 2020-05-10 RX ORDER — ACETAMINOPHEN 650 MG/1
650 SUPPOSITORY RECTAL EVERY 4 HOURS PRN
Status: DISCONTINUED | OUTPATIENT
Start: 2020-05-10 | End: 2020-05-10 | Stop reason: HOSPADM

## 2020-05-10 RX ORDER — PANTOPRAZOLE SODIUM 40 MG/1
40 TABLET, DELAYED RELEASE ORAL DAILY
Start: 2020-05-10

## 2020-05-10 RX ORDER — CETIRIZINE HYDROCHLORIDE 10 MG/1
10 TABLET ORAL DAILY
COMMUNITY
End: 2020-05-10 | Stop reason: HOSPADM

## 2020-05-10 RX ORDER — ONDANSETRON 2 MG/ML
4 INJECTION INTRAMUSCULAR; INTRAVENOUS ONCE
Status: COMPLETED | OUTPATIENT
Start: 2020-05-10 | End: 2020-05-10

## 2020-05-10 RX ORDER — ROPINIROLE 1 MG/1
1 TABLET, FILM COATED ORAL DAILY
COMMUNITY

## 2020-05-10 RX ORDER — OMEPRAZOLE 20 MG/1
20 CAPSULE, DELAYED RELEASE ORAL 2 TIMES DAILY
COMMUNITY
End: 2020-05-10 | Stop reason: HOSPADM

## 2020-05-10 RX ORDER — POTASSIUM CHLORIDE 14.9 MG/ML
20 INJECTION INTRAVENOUS ONCE
Status: DISCONTINUED | OUTPATIENT
Start: 2020-05-10 | End: 2020-05-10

## 2020-05-10 RX ORDER — AMITRIPTYLINE HYDROCHLORIDE 25 MG/1
25 TABLET, FILM COATED ORAL DAILY
Status: DISCONTINUED | OUTPATIENT
Start: 2020-05-10 | End: 2020-05-10 | Stop reason: HOSPADM

## 2020-05-10 RX ORDER — TRAMADOL HYDROCHLORIDE 50 MG/1
50 TABLET ORAL EVERY 6 HOURS PRN
COMMUNITY
End: 2020-05-10 | Stop reason: HOSPADM

## 2020-05-10 RX ORDER — ONDANSETRON 4 MG/1
4 TABLET, FILM COATED ORAL DAILY PRN
COMMUNITY
End: 2020-05-10 | Stop reason: HOSPADM

## 2020-05-10 RX ORDER — PANTOPRAZOLE SODIUM 40 MG/1
40 TABLET, DELAYED RELEASE ORAL
Status: DISCONTINUED | OUTPATIENT
Start: 2020-05-10 | End: 2020-05-10 | Stop reason: HOSPADM

## 2020-05-10 RX ORDER — SODIUM CHLORIDE 0.9 % (FLUSH) 0.9 %
10 SYRINGE (ML) INJECTION EVERY 12 HOURS SCHEDULED
Status: DISCONTINUED | OUTPATIENT
Start: 2020-05-10 | End: 2020-05-10 | Stop reason: HOSPADM

## 2020-05-10 RX ORDER — LIDOCAINE HYDROCHLORIDE 10 MG/ML
1 INJECTION, SOLUTION EPIDURAL; INFILTRATION; INTRACAUDAL; PERINEURAL ONCE
Status: COMPLETED | OUTPATIENT
Start: 2020-05-10 | End: 2020-05-10

## 2020-05-10 RX ORDER — ONDANSETRON 2 MG/ML
4 INJECTION INTRAMUSCULAR; INTRAVENOUS EVERY 6 HOURS PRN
Status: DISCONTINUED | OUTPATIENT
Start: 2020-05-10 | End: 2020-05-10 | Stop reason: HOSPADM

## 2020-05-10 RX ORDER — CETIRIZINE HYDROCHLORIDE 10 MG/1
5 TABLET ORAL DAILY
Status: DISCONTINUED | OUTPATIENT
Start: 2020-05-10 | End: 2020-05-10 | Stop reason: HOSPADM

## 2020-05-10 RX ORDER — FLUTICASONE PROPIONATE 50 MCG
2 SPRAY, SUSPENSION (ML) NASAL DAILY PRN
Status: DISCONTINUED | OUTPATIENT
Start: 2020-05-10 | End: 2020-05-10 | Stop reason: HOSPADM

## 2020-05-10 RX ORDER — FLUTICASONE PROPIONATE 50 MCG
2 SPRAY, SUSPENSION (ML) NASAL DAILY PRN
COMMUNITY

## 2020-05-10 RX ORDER — METOPROLOL TARTRATE 5 MG/5ML
5 INJECTION INTRAVENOUS EVERY 6 HOURS PRN
Status: DISCONTINUED | OUTPATIENT
Start: 2020-05-10 | End: 2020-05-10 | Stop reason: HOSPADM

## 2020-05-10 RX ADMIN — IRON SUCROSE 100 MG: 20 INJECTION, SOLUTION INTRAVENOUS at 07:58

## 2020-05-10 RX ADMIN — FUROSEMIDE 40 MG: 10 INJECTION, SOLUTION INTRAMUSCULAR; INTRAVENOUS at 03:36

## 2020-05-10 RX ADMIN — ROPINIROLE HYDROCHLORIDE 1 MG: 1 TABLET, FILM COATED ORAL at 09:10

## 2020-05-10 RX ADMIN — BUMETANIDE 0.5 MG/HR: 0.25 INJECTION, SOLUTION INTRAMUSCULAR; INTRAVENOUS at 06:13

## 2020-05-10 RX ADMIN — METOPROLOL SUCCINATE 50 MG: 50 TABLET, EXTENDED RELEASE ORAL at 09:12

## 2020-05-10 RX ADMIN — POTASSIUM CHLORIDE 40 MEQ: 750 CAPSULE, EXTENDED RELEASE ORAL at 03:47

## 2020-05-10 RX ADMIN — PANTOPRAZOLE SODIUM 40 MG: 40 TABLET, DELAYED RELEASE ORAL at 06:12

## 2020-05-10 RX ADMIN — LIDOCAINE HYDROCHLORIDE ANHYDROUS 1 ML: 10 INJECTION, SOLUTION INFILTRATION at 09:33

## 2020-05-10 RX ADMIN — CEFTRIAXONE SODIUM 1 G: 1 INJECTION, POWDER, FOR SOLUTION INTRAMUSCULAR; INTRAVENOUS at 04:48

## 2020-05-10 RX ADMIN — METOPROLOL TARTRATE 5 MG: 5 INJECTION, SOLUTION INTRAVENOUS at 04:41

## 2020-05-10 RX ADMIN — CETIRIZINE HYDROCHLORIDE 5 MG: 10 TABLET, FILM COATED ORAL at 09:10

## 2020-05-10 RX ADMIN — AMITRIPTYLINE HYDROCHLORIDE 25 MG: 25 TABLET, FILM COATED ORAL at 09:10

## 2020-05-10 RX ADMIN — ONDANSETRON HYDROCHLORIDE 4 MG: 2 SOLUTION INTRAMUSCULAR; INTRAVENOUS at 01:25

## 2020-05-10 RX ADMIN — Medication 10 ML: at 09:41

## 2020-05-10 RX ADMIN — LISINOPRIL 10 MG: 10 TABLET ORAL at 09:40

## 2020-05-10 NOTE — ED PROVIDER NOTES
Subjective   Ms. Shook is a 49-year-old female with a history significant for Sjogren's syndrome, reasonably well-controlled hypertension and GERD.  Over the last 4-weeks she has become increasingly ill.  She describes nausea and vomiting that is now persistent such that she finds it difficult to keep anything down.  She had epigastric pain that was ultimately investigated by her primary care physician with a referral to GI for an upper endoscopy and colonoscopy.  She also describes low-grade fevers on and off especially in the initial part of the illness.  She also describes weight loss of 10 to 15 pounds over the last few weeks.  She presented to [facility] because of increasing shortness of breath and general malaise.          Review of Systems   Constitutional: Positive for fatigue and unexpected weight change.   Respiratory: Positive for shortness of breath.    Gastrointestinal: Positive for nausea and vomiting.   All other systems reviewed and are negative.      Past Medical History:   Diagnosis Date   • GERD (gastroesophageal reflux disease)    • Hypertension    • Sjogren's syndrome (CMS/HCC)        No Known Allergies    History reviewed. No pertinent surgical history.    History reviewed. No pertinent family history.    Social History     Socioeconomic History   • Marital status: Single     Spouse name: Not on file   • Number of children: Not on file   • Years of education: Not on file   • Highest education level: Not on file   Tobacco Use   • Smoking status: Never Smoker   Substance and Sexual Activity   • Alcohol use: Never     Frequency: Never   • Drug use: Never   • Sexual activity: Defer           Objective   Physical Exam   Constitutional: She is oriented to person, place, and time. She appears well-developed and well-nourished. She appears distressed.   HENT:   Head: Normocephalic and atraumatic.   Mouth/Throat: Oropharynx is clear and moist.   Eyes: Conjunctivae and EOM are normal.   Neck:  Normal range of motion. Neck supple.   Cardiovascular: Regular rhythm, normal heart sounds and intact distal pulses. Tachycardia present.   Pulmonary/Chest: She is in respiratory distress. She has rales.   Abdominal: Soft. Bowel sounds are normal.   Musculoskeletal: Normal range of motion. She exhibits edema.   Neurological: She is alert and oriented to person, place, and time.   Skin: Skin is warm and dry. Capillary refill takes less than 2 seconds.   Psychiatric: She has a normal mood and affect. Her behavior is normal. Judgment and thought content normal.   Nursing note and vitals reviewed.      Procedures           ED Course                                           MDM  Number of Diagnoses or Management Options     Amount and/or Complexity of Data Reviewed  Clinical lab tests: reviewed and ordered  Tests in the radiology section of CPT®: ordered and reviewed  Tests in the medicine section of CPT®: reviewed and ordered    Critical Care  Total time providing critical care: < 30 minutes    Patient Progress  Patient progress: stable      Final diagnoses:   Congestive heart failure, unspecified HF chronicity, unspecified heart failure type (CMS/HCC)   HALI (acute kidney injury) (CMS/HCC)   Symptomatic anemia     The patient was transferred over to our institution ostensibly for because of a positive troponin.  The problems however extend far beyond that.  She is in florid heart failure with a BNP over 70,000, a chest CT that strongly suggest fluid overload and dyspnea that clinically reflects it.  The patient improved quite dramatically with BiPAP.  I also ordered IV Lasix with potassium supplementation (her potassium is 3.1).    The patient has significant renal failure with a creatinine over 6.  I consulted nephrology but the PA is unable to contact the nephrologist on-call currently.  I believe the patient does need urgent dialysis and that this would be the best resolution for her fluid overload.    The patient  is also significantly anemic with a hemoglobin of 7.1 and requires a transfusion once her fluid overload status has been stabilized.    I discussed the case with Dr. Solo and we agree that this patient needs admission to the ICU.  He kindly agreed to accept the admission under his care.  The patient is currently improved but still guarded.       Jori Novak MD  05/10/20 0253

## 2020-05-10 NOTE — DISCHARGE SUMMARY
[facility] Medicine Services  TRANSFER SUMMARY       Accepting Facility: [facility]  Accepting Physician: Dr. Naeem Magallanes on behalf of Dr. Ovidio Jean Baptiste.    Date of Admission: 5/9/2020  Date of Discharge:  5/10/2020  Primary Care Physician: Annalee Saenz MD    Presenting Problem/History of Present Illness:  Shortness of breath, decreased urine output.     Final Discharge Diagnoses:  Active Hospital Problems    Diagnosis   • **Acute respiratory failure with hypoxia (CMS/HCC)   • Volume overload   • Congestive heart failure (CMS/HCC)   • HALI (acute kidney injury) (CMS/HCC)   • Edema   • Rheumatoid arthritis (CMS/HCC)   • Sjogren's syndrome (CMS/HCC)   • Anemia   • UTI (urinary tract infection), bacterial   • Hypokalemia     Consults: Dr. Poe with nephrology. Other consults were planned, but cancelled due to transfer to [place].     Procedures Performed: None.     Pertinent Test Results:   Results for orders placed during the hospital encounter of 05/09/20   STAT Adult Transthoracic Echo Complete W/ Cont if Necessary Per Protocol    Narrative · Left ventricular systolic function is mildly decreased. EF 45-50%.  · Left ventricular wall thickness is consistent with mild concentric   hypertrophy.  · Mild to moderate aortic valve regurgitation is present.  · Mild-to-moderate mitral valve regurgitation is present  · Left atrial cavity size is dilated.  · Normal size and function of the right ventricle.  · There is a small (<1cm) pericardial effusion without echocardiographic   features of tamponade physiology.        Imaging Results (Last 7 Days)     Procedure Component Value Units Date/Time    US Venous Doppler Lower Extremity Bilateral (duplex) [[id number]] Collected:  05/10/20 1031     Updated:  05/10/20 1034    Narrative:       History: Swelling       Impression:       Impression: There is no evidence of deep venous thrombosis or  superficial  thrombophlebitis of right or left lower extremities.     Comments: Bilateral lower extremity venous duplex exam was performed  using color Doppler flow, Doppler waveform analysis, and grayscale  imaging, with and without compression. There is no evidence of deep  venous thrombosis in the common femoral, superficial femoral, popliteal,  peroneal, anterior tibial, and posterior tibial veins bilaterally. No  thrombus is identified in the saphenofemoral junctions and greater  saphenous veins bilaterally.         This report was finalized on 05/10/2020 10:31 by Dr. Dwight Espinoza MD.    US Renal Bilateral [[id number]] Collected:  05/10/20 1029     Updated:  05/10/20 1033    Narrative:       EXAMINATION:  US RENAL BILATERAL-  5/10/2020 9:50 AM CDT     HISTORY: Acute renal insufficiency. N 17.9.      COMPARISON: No comparison study.     TECHNIQUE: Grayscale and color flow imaging were performed.     FINDINGS: The visualized abdominal aorta and inferior vena cava are  normal caliber. The right kidney measures 11 cm and the left kidney  measures 10.9 cm. The cortical thickness and echogenicity are within  normal limits. There is no hydronephrosis. The urinary bladder is  decompressed by Joseph catheter.       Impression:       Bilateral renal ultrasound is within normal limits.  This report was finalized on 05/10/2020 10:30 by Dr. Stanley Brewer MD.    XR Chest 1 View [[id number]] Collected:  05/10/20 0812     Updated:  05/10/20 0816    Narrative:       EXAMINATION:  XR CHEST 1 VW-  5/10/2020 7:38 AM CDT     HISTORY: Shortness of air. I50.9-Heart failure, unspecified; N17.9-Acute  kidney failure, unspecified; D64.9-Anemia, unspecified.     COMPARISON: No comparison study.     FINDINGS:  There is cardiomegaly. There are bilateral infiltrates. There  is mild blunting of the costophrenic angles.       Impression:       1. Probable congestive heart failure.  2. Parenchymal infiltrates likely represent pulmonary edema.  The  infiltrates can also be related to infection. Correlate clinically.        This report was finalized on 05/10/2020 08:13 by Dr. Stanley Brewer MD.    CT Chest Without Contrast [[id number]] Collected:  05/10/20 0805     Updated:  05/10/20 0815    Narrative:       EXAMINATION:  CT CHEST WO CONTRAST-  5/10/2020 1:11 AM CDT     HISTORY: Severe SOB. I50.9-Heart failure, unspecified; N17.9-Acute  kidney failure, unspecified; D64.9-Anemia, unspecified.     COMPARISON : No comparison study.     DLP: 393 mGy-cm. Automated dosage control was utilized.     TECHNIQUE: Spiral CT was performed of the chest without contrast.  Sagittal and coronal images were reconstructed.     MEDIASTINUM, HEART AND VASCULAR STRUCTURES: The thoracic aorta is normal  in caliber. The main pulmonary artery segment measures 3.3 cm. There is  cardiomegaly. There is a small to moderate pericardial effusion. There  is mediastinal lymphadenopathy. Right paratracheal lymph node measures  1.9 x 1.7 cm. There are multiple other smaller mediastinal lymph nodes.  There is probable hilar adenopathy but the hilar areas are difficult to  evaluate without contrast.     LUNGS: There are small bilateral pleural effusions. There are bilateral  interstitial as well as alveolar infiltrates.     UPPER ABDOMEN: No acute appearing abnormality in the upper abdomen.     BONES: There are degenerative changes of the spine.       Impression:       1. Cardiomegaly with small to moderate pericardial effusion. Small  bilateral pleural effusions. Interstitial and alveolar infiltrates.  Congestive heart failure most likely. Infectious disease of the lungs  cannot be entirely ruled out.  2. Mediastinal and hilar lymphadenopathy can be seen in patients with  congestive failure. The differential includes infection and neoplasm.  3. Dilated pulmonary arteries.          This report was finalized on 05/10/2020 08:12 by Dr. Stanley Brewer MD.        Lab Results (last 7 days)      Procedure Component Value Units Date/Time    Eosinophil Smear - Urine, Urine, Clean Catch [[id number]] Collected:  05/10/20 0750    Specimen:  Urine, Clean Catch Updated:  05/10/20 0912    Osmolality, Urine - Urine, Clean Catch [[id number]]  (Abnormal) Collected:  05/10/20 0641    Specimen:  Urine, Clean Catch Updated:  05/10/20 0755     Osmolality, Urine 342 mOsm/kg     Protein, Urine, Random - Urine, Clean Catch [[id number]] Collected:  05/10/20 0641    Specimen:  Urine, Clean Catch Updated:  05/10/20 0725     Total Protein, Urine 206.1 mg/dL     Narrative:       Reference intervals for random urine have not been established.  Clinical usage is dependent upon physician's interpretation in combination with other laboratory tests.       Sodium, Urine, Random - Urine, Clean Catch [[id number]] Collected:  05/10/20 0641    Specimen:  Urine, Clean Catch Updated:  05/10/20 0714     Sodium, Urine 72 mmol/L     Narrative:       Reference intervals for random urine have not been established.  Clinical usage is dependent upon physician's interpretation in combination with other laboratory tests.       Creatinine, Urine, Random - Urine, Clean Catch [[id number]] Collected:  05/10/20 0642    Specimen:  Urine, Clean Catch Updated:  05/10/20 0642    Basic Metabolic Panel [[id number]]  (Abnormal) Collected:  05/10/20 0456    Specimen:  Blood Updated:  05/10/20 0527     Glucose 121 mg/dL      BUN 71 mg/dL      Creatinine 6.62 mg/dL      Sodium 135 mmol/L      Potassium 3.3 mmol/L      Chloride 97 mmol/L      CO2 18.0 mmol/L      Calcium 9.2 mg/dL      eGFR   Amer --     Comment: <15 Indicative of kidney failure.        eGFR Non African Amer 7 mL/min/1.73      Comment: <15 Indicative of kidney failure.        BUN/Creatinine Ratio 10.7     Anion Gap 20.0 mmol/L     Narrative:       GFR Normal >60  Chronic Kidney Disease <60  Kidney Failure <15      Hemoglobin & Hematocrit, Blood [[id number]]  (Abnormal) Collected:  05/10/20 0456     Specimen:  Blood Updated:  05/10/20 0509     Hemoglobin 7.1 g/dL      Hematocrit 21.7 %     Iron Profile [[id number]]  (Abnormal) Collected:  05/10/20 0335    Specimen:  Blood Updated:  05/10/20 0506     Iron 29 mcg/dL      Iron Saturation 9 %      Transferrin 220 mg/dL      TIBC 328 mcg/dL     Ferritin [[id number]]  (Abnormal) Collected:  05/10/20 0335    Specimen:  Blood Updated:  05/10/20 0506     Ferritin 256.90 ng/mL     Narrative:       Results may be falsely decreased if patient taking Biotin.      T4, Free [[id number]]  (Normal) Collected:  05/10/20 0335    Specimen:  Blood Updated:  05/10/20 0506     Free T4 1.20 ng/dL     Narrative:       Results may be falsely increased if patient taking Biotin.      TSH [[id number]]  (Abnormal) Collected:  05/10/20 0335    Specimen:  Blood Updated:  05/10/20 0506     TSH 4.590 uIU/mL      Comment: TSH results may be falsely decreased if patient taking Biotin.       Lipid Panel [[id number]]  (Abnormal) Collected:  05/10/20 0335    Specimen:  Blood Updated:  05/10/20 0506     Total Cholesterol 191 mg/dL      Triglycerides 165 mg/dL      HDL Cholesterol 40 mg/dL      LDL Cholesterol  118 mg/dL      VLDL Cholesterol 33 mg/dL      LDL/HDL Ratio 2.95    Narrative:       Cholesterol Reference Ranges  (U.S. Department of Health and Human Services ATP III Classifications)    Desirable          <200 mg/dL  Borderline High    200-239 mg/dL  High Risk          >240 mg/dL      Triglyceride Reference Ranges  (U.S. Department of Health and Human Services ATP III Classifications)    Normal           <150 mg/dL  Borderline High  150-199 mg/dL  High             200-499 mg/dL  Very High        >500 mg/dL    HDL Reference Ranges  (U.S. Department of Health and Human Services ATP III Classifcations)    Low     <40 mg/dl (major risk factor for CHD)  High    >60 mg/dl ('negative' risk factor for CHD)        LDL Reference Ranges  (U.S. Department of Health and Human Services ATP III  Classifcations)    Optimal          <100 mg/dL  Near Optimal     100-129 mg/dL  Borderline High  130-159 mg/dL  High             160-189 mg/dL  Very High        >189 mg/dL    Lipase [[id number]]  (Normal) Collected:  05/10/20 0335    Specimen:  Blood Updated:  05/10/20 0506     Lipase 43 U/L     Amylase [[id number]]  (Normal) Collected:  05/10/20 0335    Specimen:  Blood Updated:  05/10/20 0506     Amylase 47 U/L     C-reactive Protein [[id number]]  (Abnormal) Collected:  05/10/20 0335    Specimen:  Blood Updated:  05/10/20 0506     C-Reactive Protein 16.36 mg/dL     Protein Elec + Interp, Serum [[id number]] Collected:  05/10/20 0456    Specimen:  Blood Updated:  05/10/20 0505    Glomerular Basement Membrane Antibodies [[id number]] Collected:  05/10/20 0456    Specimen:  Blood Updated:  05/10/20 0505    Antistreptolysin O Titer [[id number]] Collected:  05/10/20 0456    Specimen:  Blood Updated:  05/10/20 0505    AMY [[id number]] Collected:  05/10/20 0456    Specimen:  Blood Updated:  05/10/20 0505    Anti-DNA Antibody, Double-stranded [[id number]] Collected:  05/10/20 0456    Specimen:  Blood Updated:  05/10/20 0505    Vitamin B12 [[id number]] Collected:  05/10/20 0456    Specimen:  Blood Updated:  05/10/20 0505    Folate [[id number]] Collected:  05/10/20 0456    Specimen:  Blood Updated:  05/10/20 0505    C3 Complement [[id number]] Collected:  05/10/20 0456    Specimen:  Blood Updated:  05/10/20 0505    C4 Complement [[id number]] Collected:  05/10/20 0456    Specimen:  Blood Updated:  05/10/20 0505    Rheumatoid Factor [[id number]] Collected:  05/10/20 0456    Specimen:  Blood Updated:  05/10/20 0505    Reticulocytes [[id number]]  (Abnormal) Collected:  05/10/20 0057    Specimen:  Blood Updated:  05/10/20 0437     Reticulocyte % 2.51 %      Reticulocyte Absolute 0.0758 10*6/mm3     Troponin [[id number]]  (Abnormal) Collected:  05/10/20 0335    Specimen:  Blood Updated:  05/10/20 0413     Troponin T 0.115 ng/mL     Narrative:        Troponin T Reference Range:  <= 0.03 ng/mL-   Negative for AMI  >0.03 ng/mL-     Abnormal for myocardial necrosis.  Clinicians would have to utilize clinical acumen, EKG, Troponin and serial changes to determine if it is an Acute Myocardial Infarction or myocardial injury due to an underlying chronic condition.       Results may be falsely decreased if patient taking Biotin.      Phosphorus [[id number]]  (Abnormal) Collected:  05/10/20 0057    Specimen:  Blood Updated:  05/10/20 0335     Phosphorus 5.9 mg/dL     Magnesium [[id number]]  (Normal) Collected:  05/10/20 0057    Specimen:  Blood Updated:  05/10/20 0335     Magnesium 1.9 mg/dL     Blood Gas, Arterial With Co-Ox [[id number]]  (Abnormal) Collected:  05/10/20 0253    Specimen:  Arterial Blood Updated:  05/10/20 0301     Site Left Radial     Reji's Test Positive     pH, Arterial 7.427 pH units      pCO2, Arterial 32.3 mm Hg      Comment: 84 Value below reference range        pO2, Arterial 90.0 mm Hg      HCO3, Arterial 21.2 mmol/L      Base Excess, Arterial -2.8 mmol/L      Comment: 84 Value below reference range        O2 Saturation, Arterial 97.5 %      Hemoglobin, Blood Gas 7.4 g/dL      Comment: 84 Value below reference range        Hematocrit, Blood Gas 22.8 %      Comment: 84 Value below reference range        Oxyhemoglobin 95.8 %      Methemoglobin 0.30 %      Carboxyhemoglobin 1.4 %      A-a Gradiant 157.7 mmHg      Temperature 37.0 C      Sodium, Arterial 137 mmol/L      Potassium, Arterial 3.3 mmol/L      Comment: 84 Value below reference range        Barometric Pressure for Blood Gas 755 mmHg      Modality BiPap     FIO2 40 %      Ventilator Mode NA     Set [facility] Resp Rate 16.0     IPAP 12     Comment: Meter: [id number]     :  [id number]        EPAP 6     Note --     Collected by [id number]     pH, Temp Corrected --     pCO2, Temperature Corrected --     pO2, Temperature Corrected --    BNP [[id number]]  (Abnormal) Collected:  05/10/20 0057     Specimen:  Blood Updated:  05/10/20 0146     proBNP >70,000.0 pg/mL     Narrative:       Among patients with dyspnea, NT-proBNP is highly sensitive for the detection of acute congestive heart failure. In addition NT-proBNP of <300 pg/ml effectively rules out acute congestive heart failure with 99% negative predictive value.    Results may be falsely decreased if patient taking Biotin.      Manual Differential [[id number]]  (Abnormal) Collected:  05/10/20 0057    Specimen:  Blood Updated:  05/10/20 0141     Neutrophil % 82.0 %      Lymphocyte % 11.0 %      Monocyte % 3.0 %      Eosinophil % 2.0 %      Basophil % 1.0 %      Atypical Lymphocyte % 1.0 %      Neutrophils Absolute 13.36 10*3/mm3      Lymphocytes Absolute 1.79 10*3/mm3      Monocytes Absolute 0.49 10*3/mm3      Eosinophils Absolute 0.33 10*3/mm3      Basophils Absolute 0.16 10*3/mm3      nRBC 1.0 /100 WBC      Microcytes Slight/1+     Polychromasia Large/3+     RBC Fragments Mod/2+     Rouleaux Slight/1+     WBC Morphology Normal     Platelet Morphology Normal    CBC Auto Differential [[id number]]  (Abnormal) Collected:  05/10/20 0057    Specimen:  Blood Updated:  05/10/20 0130     WBC 16.29 10*3/mm3      RBC 3.06 10*6/mm3      Hemoglobin 7.1 g/dL      Hematocrit 22.0 %      MCV 71.9 fL      MCH 23.2 pg      MCHC 32.3 g/dL      RDW 16.8 %      RDW-SD 43.0 fl      MPV 10.0 fL      Platelets 361 10*3/mm3     Protime-INR [[id number]]  (Abnormal) Collected:  05/10/20 0057    Specimen:  Blood Updated:  05/10/20 0130     Protime 16.2 Seconds      INR 1.34    aPTT [[id number]]  (Normal) Collected:  05/10/20 0057    Specimen:  Blood Updated:  05/10/20 0130     PTT 25.6 seconds     D-dimer, Quantitative [[id number]]  (Abnormal) Collected:  05/10/20 0057    Specimen:  Blood Updated:  05/10/20 0130     D-Dimer, Quantitative 5.73 mg/L (FEU)     Narrative:       Reference Range is 0-0.50 mg/L FEU. However, results <0.50 mg/L FEU tends to rule out DVT or PE. Results >0.50 mg/L  FEU are not useful in predicting absence or presence of DVT or PE.      Troponin [[id number]]  (Abnormal) Collected:  05/10/20 0057    Specimen:  Blood Updated:  05/10/20 0129     Troponin T 0.121 ng/mL     Narrative:       Troponin T Reference Range:  <= 0.03 ng/mL-   Negative for AMI  >0.03 ng/mL-     Abnormal for myocardial necrosis.  Clinicians would have to utilize clinical acumen, EKG, Troponin and serial changes to determine if it is an Acute Myocardial Infarction or myocardial injury due to an underlying chronic condition.       Results may be falsely decreased if patient taking Biotin.      Comprehensive Metabolic Panel [[id number]]  (Abnormal) Collected:  05/10/20 0057    Specimen:  Blood Updated:  05/10/20 0125     Glucose 116 mg/dL      BUN 70 mg/dL      Creatinine 6.49 mg/dL      Sodium 135 mmol/L      Potassium 3.1 mmol/L      Chloride 96 mmol/L      CO2 20.0 mmol/L      Calcium 9.2 mg/dL      Total Protein 6.9 g/dL      Albumin 2.70 g/dL      ALT (SGPT) 7 U/L      AST (SGOT) 15 U/L      Alkaline Phosphatase 69 U/L      Total Bilirubin 0.6 mg/dL      eGFR Non African Amer 7 mL/min/1.73      Comment: <15 Indicative of kidney failure.        eGFR   Amer --     Comment: <15 Indicative of kidney failure.        Globulin 4.2 gm/dL      A/G Ratio 0.6 g/dL      BUN/Creatinine Ratio 10.8     Anion Gap 19.0 mmol/L     Narrative:       GFR Normal >60  Chronic Kidney Disease <60  Kidney Failure <15      Blood Gas, Arterial With Co-Ox [[id number]]  (Abnormal) Collected:  05/10/20 0055    Specimen:  Arterial Blood Updated:  05/10/20 0109     Site Left Radial     Reji's Test Positive     pH, Arterial 7.510 pH units      Comment: 83 Value above reference range        pCO2, Arterial 23.6 mm Hg      Comment: 84 Value below reference range        pO2, Arterial 53.1 mm Hg      Comment: 85 Value below critical limit        HCO3, Arterial 18.8 mmol/L      Comment: 84 Value below reference range        Base Excess,  Arterial -3.5 mmol/L      Comment: 84 Value below reference range        O2 Saturation, Arterial 91.8 %      Comment: 84 Value below reference range        Hemoglobin, Blood Gas 7.6 g/dL      Comment: 84 Value below reference range        Hematocrit, Blood Gas 23.2 %      Comment: 84 Value below reference range        Oxyhemoglobin 79.9 %      Comment: 84 Value below reference range        Methemoglobin 0.20 %      Carboxyhemoglobin 12.7 %      Comment: 83 Value above reference range        A-a Gradiant 69.1 mmHg      Temperature 37.0 C      Sodium, Arterial 136 mmol/L      Comment: 84 Value below reference range        Potassium, Arterial 3.2 mmol/L      Comment: 84 Value below reference range        Barometric Pressure for Blood Gas 756 mmHg      Modality Room Air     FIO2 21 %      Ventilator Mode NA     Note --     Notified Who DR. PINTO     Notified By [id number]     Notified Time 05/10/2020 01:13     Collected by [id number]     Comment: Meter: [id number]     :  [id number]        pH, Temp Corrected --     pCO2, Temperature Corrected --     pO2, Temperature Corrected --    [facility] In-House SARS-CoV-2, NAAT, NP Swab (NO TRANSPORT MEDIA) - Swab, Nasopharynx [[id number]]  (Normal) Collected:  05/10/20 0013    Specimen:  Swab from Nasopharynx Updated:  05/10/20 0040     COVID19 Not Detected        Hospital Course:  This is a 49-year-old  female patient who resides in [place], [place].  She sees Dr. Annalee Saenz for primary care.  She has a history of Sjogren's syndrome and rheumatoid arthritis.  She was previously treated for these conditions by Dr. Jordana Javier with rheumatology.  She was transferred from [facility] late last evening after presenting there with complaints of shortness of breath and decreased urination.  She was found to be in acute congestive heart failure with acute renal failure.  She had a normal serum creatinine of 0.6 in March 2018.    She was evaluated by   "Lui with nephrology early this morning.  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  He believes that this is causing her picture of acute renal failure and acute congestive heart failure.  He requested me to transfer the patient after seeing her this morning.    I have had multiple phone calls with [facility].  Dr. Naeem Magallanes with the pulmonary MICU service has accepted the patient on behalf of of Dr. Ovidio Jean Baptiste.  Current laboratory testing vitals, and exam reviewed with him.  I told him specifically that we have not initiated steroids yet.  He understands that she will likely require dialysis, but seems to be having some diuresis on a Bumex drip at present.  He was also made aware that the patient did screen negative for COVID-19 by [facility] in-house testing.    Dr. Poe was updated that she is being transferred.    Dr. Solo, the admitting physician, had some concern that she might have a urinary tract infection based on results from her [facility].  She is currently on ceftriaxone.    Physical Exam on Discharge:  BP (!) 150/105   Pulse 102   Temp 98.2 °F (36.8 °C) (Axillary)   Resp (!) 35   Ht 160 cm (63\")   Wt 84.7 kg (186 lb 11.2 oz)   LMP 03/09/2020 (Approximate)   SpO2 100%   Breastfeeding No   BMI 33.07 kg/m²   Physical Exam   Constitutional: She is oriented to person, place, and time.   Up in bed.  No readily identifiable distress.  Nontoxic.  Seen and discussed with her nurse, Colton.   HENT:   Head: Normocephalic and atraumatic.   Eyes: Pupils are equal, round, and reactive to light. Conjunctivae and EOM are normal.   Neck: Neck supple. No JVD present.   Cardiovascular: Normal rate, regular rhythm, normal heart sounds and intact distal pulses. Exam reveals no gallop and no friction rub.   No murmur heard.  Slightly tachycardic, sinus. Hypertensive.   Pulmonary/Chest: No respiratory distress. She has no " wheezes. She has rales. She exhibits no tenderness.   Tolerating BiPAP 12/6 on 40% FiO2.   Abdominal: Soft. Bowel sounds are normal. She exhibits no distension. There is no tenderness. There is no rebound and no guarding.   Musculoskeletal: Normal range of motion. She exhibits edema. She exhibits no tenderness or deformity.   Neurological: She is alert and oriented to person, place, and time. She displays normal reflexes. No cranial nerve deficit. She exhibits normal muscle tone.   Skin: Skin is warm and dry. No rash noted.   Psychiatric:   Flat.     Condition on Discharge: Stable for transfer to [facility] by ambulance.     Discharge Disposition:  Transfer to Another Facility    Discharge Medications:     Discharge Medications      New Medications      Instructions Start Date   acetaminophen 325 MG tablet  Commonly known as:  TYLENOL   650 mg, Oral, Every 4 Hours PRN      bumetanide 10 mg in sodium chloride 0.9 % 60 mL   0.5 mg/hr (0.5 mg/hr), Intravenous, Continuous      cefTRIAXone  Commonly known as:  ROCEPHIN   1 g, Intravenous, Every 24 Hours   Start Date:  May 11, 2020     iron sucrose 100 mg in sodium chloride 0.9 % 100 mL IVPB   100 mg, Intravenous, Every 24 Hours   Start Date:  May 11, 2020     metoprolol tartrate 5 MG/5ML injection  Commonly known as:  LOPRESSOR   5 mg, Intravenous, Every 6 Hours PRN      ondansetron 4 MG/2ML injection  Commonly known as:  ZOFRAN  Replaces:  ondansetron 4 MG tablet   4 mg, Intravenous, Every 6 Hours PRN      pantoprazole 40 MG EC tablet  Commonly known as:  PROTONIX  Replaces:  omeprazole 20 MG capsule   40 mg, Oral, Daily         Changes to Medications      Instructions Start Date   cetirizine 5 MG tablet  Commonly known as:  zyrTEC  What changed:    · medication strength  · how much to take   5 mg, Oral, Daily   Start Date:  May 11, 2020        Continue These Medications      Instructions Start Date   amitriptyline 25 MG tablet  Commonly known as:  ELAVIL   25 mg, Oral,  Daily      fluticasone 50 MCG/ACT nasal spray  Commonly known as:  FLONASE   2 sprays, Nasal, Daily PRN      metoprolol succinate XL 50 MG 24 hr tablet  Commonly known as:  TOPROL-XL   50 mg, Oral, Daily      rOPINIRole 1 MG tablet  Commonly known as:  REQUIP   1 mg, Oral, Daily, Take 1 hour before bedtime.          Stop These Medications    famotidine 40 MG tablet  Commonly known as:  PEPCID     omeprazole 20 MG capsule  Commonly known as:  priLOSEC  Replaced by:  pantoprazole 40 MG EC tablet     ondansetron 4 MG tablet  Commonly known as:  ZOFRAN  Replaced by:  ondansetron 4 MG/2ML injection     rizatriptan 10 MG tablet  Commonly known as:  MAXALT     traMADol 50 MG tablet  Commonly known as:  ULTRAM          Discharge Diet:   Diet Instructions     Diet: Nothing By Mouth      Discharge Diet:  Nothing By Mouth        Activity at Discharge:   Activity Instructions     Up WIth Assist          Follow-up Appointments:   Per Angela once discharged from their facility.  She sees Dr. Annalee Saenz for primary care in [place], [place].    Test Results Pending at Discharge: Multiple serologies and other test as evidenced above.    Hay Arora DO  05/10/20  10:38    Time: 40 minutes.

## 2020-05-10 NOTE — NURSING NOTE
Received notification from Dr. Arora stating the patient has been accepted at [facility] with Dr. Jean Baptiste.  Will continue to monitor the patient and situation for a bed and transport.

## 2020-05-10 NOTE — H&P
[facility] Medicine Services  HISTORY AND PHYSICAL    Date of Admission: 5/9/2020  Primary Care Physician: Provider, No Known    Subjective     Chief Complaint: Cannot control blood pressure    History of Present Illness  Patient is a 49-year-old white female past medical history of Sjogren's syndrome and rheumatoid arthritis.  For the last month she is been having bouts with nausea vomiting and abdominal pain.  She was also found to be anemic with a hemoglobin 9.2 range.  She is been getting an outpatient work-up that included so far and upper and lower endoscopy both of which were unrevealing however they did do biopsies and polypectomies.  Patient supposed to have a HIDA scan in the near future.  She also has been having problems controlling her blood pressure she can concerned tonight because it was 215/112.  She presented to an outlying facility to be evaluated was found to have an elevated blood pressure also found to have a UTI and be in renal failure with a creatinine greater than 6 she had elevated white count of 17.4 hemoglobin was 7.6.  She also had an elevated troponin and D-dimers chest x-ray showed bilateral infiltrates consistent with possible failure she has a BNP at the other facility of 178,239.  D-dimers were greater than 7900.  Troponin was also elevated at 0.24.  EKG showed nothing remarkable.  Patient denies any chest pain.  She has had severe shortness of breath that started approximately 3 days ago to the point where she has difficulty walking across the room without stopping.  She has had a low-grade fever but nothing different from her usual symptoms with Sjogren's.  She used to be on CellCept and prednisone but is been off of those for about 2 years now.  She has no history of renal failure although creatinine was 2.3 approximately 2 weeks ago on outpatient labs.  Her mother of note is on dialysis apparently related to colitis and a partial colectomy.   She states she was evaluated then as well for UTI and did not have 1.  She has had 2 Covid swabs 1 in the ER tonight both of which are negative.  CT scan of the chest was obtained which shows evidence of congestive heart failure and pleural effusions.  She is still making urine but is darker she said.  She denies weight gain but she has had weight loss with the nausea and vomiting.  She denies significant cough that is productive of sputum she does have a chronic cough from her Sjogren's.  She was initially in some distress respiratory wise however she is been placed on BiPAP now and is resting more comfortably her sats were in the 80s here.  She did get bolused a liter of fluids at the other facility.  On other review systems she denies hemoptysis she denies blood in her urine she denies any melena or bright red blood per rectum.  She denies hematemesis.        Review of Systems   14 point review of systems negative except for as per HPI    Otherwise complete ROS reviewed and negative except as mentioned in the HPI.    Past Medical History:   Past Medical History:   Diagnosis Date   • Allergic rhinitis    • Anemia    • Depression    • Fatty liver    • GERD (gastroesophageal reflux disease)    • Hypertension    • Raynaud's disease    • Rheumatoid arthritis (CMS/HCC)    • Sjogren's syndrome (CMS/HCC)    • Venous stasis ulcer (CMS/HCC) 2018     Past Surgical History:  Past Surgical History:   Procedure Laterality Date   • ADENOIDECTOMY     • LIVER BIOPSY     • TONSILLECTOMY       Social History:  reports that she has never smoked. She does not have any smokeless tobacco history on file. She reports that she does not drink alcohol or use drugs.    Family History: family history includes Heart disease in her father; Hypertension in her father and mother; Kidney failure in her mother.       Allergies:  Allergies   Allergen Reactions   • Hydroxychloroquine Sulfate Diarrhea   • Meloxicam Hives   • Penicillins Diarrhea      "    Medications:  Prior to Admission medications    Medication Sig Start Date End Date Taking? Authorizing Provider   amitriptyline (ELAVIL) 25 MG tablet Take 25 mg by mouth Daily.   Yes Shabnam Beach MD   cetirizine (zyrTEC) 10 MG tablet Take 10 mg by mouth Daily.   Yes Shabnam Beach MD   famotidine (PEPCID) 40 MG tablet Take 40 mg by mouth 2 (Two) Times a Day.   Yes Shabnam Beach MD   fluticasone (FLONASE) 50 MCG/ACT nasal spray 2 sprays into the nostril(s) as directed by provider Daily As Needed for Rhinitis.   Yes Shabnam Beach MD   metoprolol succinate XL (TOPROL-XL) 50 MG 24 hr tablet Take 50 mg by mouth Daily.   Yes Shabnam Beach MD   omeprazole (priLOSEC) 20 MG capsule Take 20 mg by mouth 2 (Two) Times a Day.   Yes Shabnam Beach MD   ondansetron (ZOFRAN) 4 MG tablet Take 4 mg by mouth Daily As Needed for Nausea or Vomiting.   Yes Shabnam Beach MD   rizatriptan (MAXALT) 10 MG tablet Take 10 mg by mouth 1 (One) Time As Needed for Migraine. May repeat in 2 hours if needed   Yes Shabnam Beach MD   rOPINIRole (REQUIP) 1 MG tablet Take 1 mg by mouth Daily. Take 1 hour before bedtime.   Yes Shabnam Beach MD   traMADol (ULTRAM) 50 MG tablet Take 50 mg by mouth Every 6 (Six) Hours As Needed for Moderate Pain .   Yes Shabnam Beach MD     Objective     Vital Signs: /100   Pulse 110   Temp 98.8 °F (37.1 °C) (Axillary)   Resp (!) 30   Ht 160 cm (63\")   Wt 81.6 kg (180 lb)   LMP 03/09/2020 (Approximate)   SpO2 98%   Breastfeeding No   BMI 31.89 kg/m²   Physical Exam  Gen.: Ill-appearing white female moderately short of breath but resting comfortably on BiPAP  HEENT: Atraumatic, normocephalic.  Pupils equal, round, and reactive to light. Extraocular movements intact.  Sclerae anicteric.  External ears negative.  Mucous membranes moist.  Neck is supple without lymphadenopathy.  Positive JVD is noted.  No carotid bruits are " auscultated.  Oropharynx is without erythema or exudate.   Chest: Rales bilaterally diffuse.  CV: Regular rate and rhythm.  Normal S1-S2.  S3-S4 gallops, murmurs. or rubs.  Abdomen: Soft, nontender, nondistended.  Active bowel sounds,  No hepatosplenomegaly.  No masses.  No hernias.  Extremities: No clubbing, 1+ edema, or cyanosis.  Capillary refill is normal.  Pulses are 2+ and symmetric at radial and dorsalis pedis.  Posterior tibial pulses are intact and 2+ palpable.  Neuro: Patient is awake, alert, and oriented ×3.  Cranial nerves II through XII are grossly intact.  Motor is 5 out of 5 bilaterally.  DTRs are 2+ and symmetric bilaterally. Sensory exam is nonfocal  Skin: Warm, dry, and intact.  No evidence of breakdown.  Sensorium: Normal thought and affect    Nursing notes and vital signs reviewed        Results Reviewed:  Lab Results (last 24 hours)     Procedure Component Value Units Date/Time    Troponin [[id number]] Collected:  05/10/20 0335    Specimen:  Blood Updated:  05/10/20 0347    Phosphorus [[id number]]  (Abnormal) Collected:  05/10/20 0057    Specimen:  Blood Updated:  05/10/20 0335     Phosphorus 5.9 mg/dL     Magnesium [[id number]]  (Normal) Collected:  05/10/20 0057    Specimen:  Blood Updated:  05/10/20 0335     Magnesium 1.9 mg/dL     Blood Gas, Arterial With Co-Ox [[id number]]  (Abnormal) Collected:  05/10/20 0253    Specimen:  Arterial Blood Updated:  05/10/20 0301     Site Left Radial     Reji's Test Positive     pH, Arterial 7.427 pH units      pCO2, Arterial 32.3 mm Hg      Comment: 84 Value below reference range        pO2, Arterial 90.0 mm Hg      HCO3, Arterial 21.2 mmol/L      Base Excess, Arterial -2.8 mmol/L      Comment: 84 Value below reference range        O2 Saturation, Arterial 97.5 %      Hemoglobin, Blood Gas 7.4 g/dL      Comment: 84 Value below reference range        Hematocrit, Blood Gas 22.8 %      Comment: 84 Value below reference range        Oxyhemoglobin 95.8 %       Methemoglobin 0.30 %      Carboxyhemoglobin 1.4 %      A-a Gradiant 157.7 mmHg      Temperature 37.0 C      Sodium, Arterial 137 mmol/L      Potassium, Arterial 3.3 mmol/L      Comment: 84 Value below reference range        Barometric Pressure for Blood Gas 755 mmHg      Modality BiPap     FIO2 40 %      Ventilator Mode NA     Set [facility] Resp Rate 16.0     IPAP 12     Comment: Meter: [id number]     :  [id number]        EPAP 6     Note --     Collected by [id number]     pH, Temp Corrected --     pCO2, Temperature Corrected --     pO2, Temperature Corrected --    BNP [[id number]]  (Abnormal) Collected:  05/10/20 0057    Specimen:  Blood Updated:  05/10/20 0146     proBNP >70,000.0 pg/mL     Narrative:       Among patients with dyspnea, NT-proBNP is highly sensitive for the detection of acute congestive heart failure. In addition NT-proBNP of <300 pg/ml effectively rules out acute congestive heart failure with 99% negative predictive value.    Results may be falsely decreased if patient taking Biotin.      Manual Differential [[id number]]  (Abnormal) Collected:  05/10/20 0057    Specimen:  Blood Updated:  05/10/20 0141     Neutrophil % 82.0 %      Lymphocyte % 11.0 %      Monocyte % 3.0 %      Eosinophil % 2.0 %      Basophil % 1.0 %      Atypical Lymphocyte % 1.0 %      Neutrophils Absolute 13.36 10*3/mm3      Lymphocytes Absolute 1.79 10*3/mm3      Monocytes Absolute 0.49 10*3/mm3      Eosinophils Absolute 0.33 10*3/mm3      Basophils Absolute 0.16 10*3/mm3      nRBC 1.0 /100 WBC      Microcytes Slight/1+     Polychromasia Large/3+     RBC Fragments Mod/2+     Rouleaux Slight/1+     WBC Morphology Normal     Platelet Morphology Normal    CBC & Differential [[id number]] Collected:  05/10/20 0057    Specimen:  Blood Updated:  05/10/20 0130    Narrative:       The following orders were created for panel order CBC & Differential.  Procedure                               Abnormality         Status                        ---------                               -----------         ------                     CBC Auto Differential[[id number]]         Abnormal            Final result                 Please view results for these tests on the individual orders.    CBC Auto Differential [[id number]]  (Abnormal) Collected:  05/10/20 0057    Specimen:  Blood Updated:  05/10/20 0130     WBC 16.29 10*3/mm3      RBC 3.06 10*6/mm3      Hemoglobin 7.1 g/dL      Hematocrit 22.0 %      MCV 71.9 fL      MCH 23.2 pg      MCHC 32.3 g/dL      RDW 16.8 %      RDW-SD 43.0 fl      MPV 10.0 fL      Platelets 361 10*3/mm3     Protime-INR [[id number]]  (Abnormal) Collected:  05/10/20 0057    Specimen:  Blood Updated:  05/10/20 0130     Protime 16.2 Seconds      INR 1.34    aPTT [[id number]]  (Normal) Collected:  05/10/20 0057    Specimen:  Blood Updated:  05/10/20 0130     PTT 25.6 seconds     D-dimer, Quantitative [[id number]]  (Abnormal) Collected:  05/10/20 0057    Specimen:  Blood Updated:  05/10/20 0130     D-Dimer, Quantitative 5.73 mg/L (FEU)     Narrative:       Reference Range is 0-0.50 mg/L FEU. However, results <0.50 mg/L FEU tends to rule out DVT or PE. Results >0.50 mg/L FEU are not useful in predicting absence or presence of DVT or PE.      Troponin [[id number]]  (Abnormal) Collected:  05/10/20 0057    Specimen:  Blood Updated:  05/10/20 0129     Troponin T 0.121 ng/mL     Narrative:       Troponin T Reference Range:  <= 0.03 ng/mL-   Negative for AMI  >0.03 ng/mL-     Abnormal for myocardial necrosis.  Clinicians would have to utilize clinical acumen, EKG, Troponin and serial changes to determine if it is an Acute Myocardial Infarction or myocardial injury due to an underlying chronic condition.       Results may be falsely decreased if patient taking Biotin.      Comprehensive Metabolic Panel [[id number]]  (Abnormal) Collected:  05/10/20 0057    Specimen:  Blood Updated:  05/10/20 0125     Glucose 116 mg/dL      BUN 70 mg/dL      Creatinine 6.49 mg/dL       Sodium 135 mmol/L      Potassium 3.1 mmol/L      Chloride 96 mmol/L      CO2 20.0 mmol/L      Calcium 9.2 mg/dL      Total Protein 6.9 g/dL      Albumin 2.70 g/dL      ALT (SGPT) 7 U/L      AST (SGOT) 15 U/L      Alkaline Phosphatase 69 U/L      Total Bilirubin 0.6 mg/dL      eGFR Non African Amer 7 mL/min/1.73      Comment: <15 Indicative of kidney failure.        eGFR   Amer --     Comment: <15 Indicative of kidney failure.        Globulin 4.2 gm/dL      A/G Ratio 0.6 g/dL      BUN/Creatinine Ratio 10.8     Anion Gap 19.0 mmol/L     Narrative:       GFR Normal >60  Chronic Kidney Disease <60  Kidney Failure <15      Blood Gas, Arterial With Co-Ox [[id number]]  (Abnormal) Collected:  05/10/20 0055    Specimen:  Arterial Blood Updated:  05/10/20 0109     Site Left Radial     Reji's Test Positive     pH, Arterial 7.510 pH units      Comment: 83 Value above reference range        pCO2, Arterial 23.6 mm Hg      Comment: 84 Value below reference range        pO2, Arterial 53.1 mm Hg      Comment: 85 Value below critical limit        HCO3, Arterial 18.8 mmol/L      Comment: 84 Value below reference range        Base Excess, Arterial -3.5 mmol/L      Comment: 84 Value below reference range        O2 Saturation, Arterial 91.8 %      Comment: 84 Value below reference range        Hemoglobin, Blood Gas 7.6 g/dL      Comment: 84 Value below reference range        Hematocrit, Blood Gas 23.2 %      Comment: 84 Value below reference range        Oxyhemoglobin 79.9 %      Comment: 84 Value below reference range        Methemoglobin 0.20 %      Carboxyhemoglobin 12.7 %      Comment: 83 Value above reference range        A-a Gradiant 69.1 mmHg      Temperature 37.0 C      Sodium, Arterial 136 mmol/L      Comment: 84 Value below reference range        Potassium, Arterial 3.2 mmol/L      Comment: 84 Value below reference range        Barometric Pressure for Blood Gas 756 mmHg      Modality Room Air     FIO2 21 %       Ventilator Mode NA     Note --     Notified Who DR. PINTO     Notified By [id number]     Notified Time 05/10/2020 01:13     Collected by [id number]     Comment: Meter: [id number]     :  [id number]        pH, Temp Corrected --     pCO2, Temperature Corrected --     pO2, Temperature Corrected --    [facility] In-House SARS-CoV-2, NAAT, NP Swab (NO TRANSPORT MEDIA) - Swab, Nasopharynx [[id number]]  (Normal) Collected:  05/10/20 0013    Specimen:  Swab from Nasopharynx Updated:  05/10/20 0040     COVID19 Not Detected        Imaging Results (Last 24 Hours)     Procedure Component Value Units Date/Time    CT Chest Without Contrast [[id number]] Resulted:  05/10/20 0111     Updated:  05/10/20 0120        I have personally reviewed and interpreted the radiology studies and ECG obtained at time of admission.     Assessment / Plan     Assessment:   Active Hospital Problems    Diagnosis   • **Volume overload   • Congestive heart failure (CMS/HCC)   • HALI (acute kidney injury) (CMS/HCC)   • Anemia   • Rheumatoid arthritis (CMS/HCC)   • Edema   • Acute respiratory failure with hypoxia (CMS/HCC)   • UTI (urinary tract infection), bacterial   • Hypokalemia   • Sjogren's syndrome (CMS/HCC)   1.  Volume overload uncertain if this is congestive heart failure causing renal failure or renal failure causing congestive heart failure I suspect it is renal failure causing volume overload.  Have attempted to contact nephrology about possible dialysis we are still waiting on their call back.  In the meantime we will give patient some IV Lasix to see if we can get her to diurese.  I suspect the liter bolus she got at the other facility worsened her respiratory status because she did not seem to have as much problem there and was on room air.  Here her sats are in the 80s with out BiPAP.  Will consult nephrology check renal ultrasound check 2D echo.  2.  Congestive heart failure will check 2D echo try to diurese as well as we can consult  cardiology as well trend troponins serial EKGs.  3.  Acute kidney injury uncertain as to if this is been brought on by nausea and vomiting I suspect she has other autoimmune issues causing as this may be why she is been having the nausea and vomiting.  Will check renal ultrasound to evaluate for obstruction treat her urinary tract infection consult nephrology check AMY C3-C4 urine for eosinophils.  She may need steroids if indeed it is an autoimmune process.  Of note her mother is on dialysis also but not for autoimmune reasons.  4.  Autoimmune disorder patient has diagnoses of both of her amiloride's and rheumatoid arthritis as well as Sjogren's.  She needs to be connected back with a rheumatologist unfortunately we do not have one on staff currently.  If it does turn out that she does need that she may have to be transferred to a higher level of care where rheumatology is available.  Will check AMY C3-C4 sed rate CRP.  5.  UTI she is already received antibiotics at the other facility no point doing blood cultures here over they did cultures will give her IV Rocephin.  6.  Anemia check substrates she did have a negative occult blood at the other facility.  7.  Elevated D-dimers will do venous ultrasounds and order VQ scan in the morning.  I do not believe she is got a PE these are probably elevated due to the renal failure.    Patient seen after midnight         Code Status: Full     I discussed the patient's findings and my recommendations with the patient and ER attending.  She designates her mother as her surrogate healthcare decision maker.    Estimated length of stay greater than 2 nights.    Patient seen and examined by me on May 10, 2020 at 3:45 AM.    Feliberto Solo MD   05/10/20   04:05

## 2020-05-10 NOTE — CONSULTS
10 cm dual lumen Midline catheter placed in pt left basilic vein. Pt tolerated procedure well. Line flushes and draws well. Sterile dressing applied.

## 2020-05-10 NOTE — NURSING NOTE
Report called to [facility]. [facility] arrived to transport pt. [facility] gtt stopped for transport, approved by .

## 2020-05-10 NOTE — CONSULTS
Nephrology ([facility]) Consult Note      Patient:  Leila Shook  YOB: 1970  Date of Service: 5/10/2020  MRN: [id number]   Acct: [id number]   Primary Care Physician: Annalee Saenz MD  Advance Directive:   Code Status and Medical Interventions:   Ordered at: 05/10/20 0353     Level Of Support Discussed With:    Patient     Code Status:    CPR     Medical Interventions (Level of Support Prior to Arrest):    Full     Admit Date: 5/9/2020       Hospital Day: 0  Referring Provider: Feliberto Solo MD      Patient Seen, Chart, Consults, Notes, Labs, Radiology studies reviewed.        Subjective:  Leila Shook is a 49 y.o. female  whom we were consulted for HALI on top of CKD. Patient has a history of Sjogren's syndrome since 2013 and rheumatoid arthritis. She was having GI symptoms (nausea vomiting and abdominal pain) along with worsening anemia and had to undergo a GI workup.  She has hypertension but lately her blood pressure was difficult to control. She presented to [facility] to be evaluated for elevated blood pressure (systolic> 200, diastolic > 100). She was found to have a UTI and advanced renal failure (creatinine > 6). Her hgb was in 7s range. Her troponin, pro-BNP and D-dimers were elevated .Her chest x-ray showed bilateral infiltrates. Patient was dyspneic and she got transferred to [facility] in [place], [place].  A Joseph catheter was placed and she was producing some urine. Metoprolol IV was given and her Bps improved. Patient admitted to some chest pain. She admitted to some fever and 2 COVID-19 tests were negative.  Patient is now aware of scleroderma and it has been over 2 years since she saw rheumatology.  She used to be on CellCept and prednisone but is been off of those for about 2 years now. Her creat was 2.3 (2 weeks ago).  She denied significant changes to her urine habits. Patient admitted to some dry cough, dyspnea on exertion and at rest. She has decreased  appetite.    Allergies:  Hydroxychloroquine sulfate; Meloxicam; and Penicillins    Home Meds:  Medications Prior to Admission   Medication Sig Dispense Refill Last Dose   • amitriptyline (ELAVIL) 25 MG tablet Take 25 mg by mouth Daily.   5/8/2020 at 2100   • cetirizine (zyrTEC) 10 MG tablet Take 10 mg by mouth Daily.   5/8/2020 at 0900   • famotidine (PEPCID) 40 MG tablet Take 40 mg by mouth 2 (Two) Times a Day.   5/8/2020 at 2100   • fluticasone (FLONASE) 50 MCG/ACT nasal spray 2 sprays into the nostril(s) as directed by provider Daily As Needed for Rhinitis.   5/8/2020 at 0900   • metoprolol succinate XL (TOPROL-XL) 50 MG 24 hr tablet Take 50 mg by mouth Daily.   5/9/2020 at 0900   • omeprazole (priLOSEC) 20 MG capsule Take 20 mg by mouth 2 (Two) Times a Day.   5/8/2020 at 2100   • ondansetron (ZOFRAN) 4 MG tablet Take 4 mg by mouth Daily As Needed for Nausea or Vomiting.   5/8/2020 at 2100   • rizatriptan (MAXALT) 10 MG tablet Take 10 mg by mouth 1 (One) Time As Needed for Migraine. May repeat in 2 hours if needed   5/3/2020 at 0900   • rOPINIRole (REQUIP) 1 MG tablet Take 1 mg by mouth Daily. Take 1 hour before bedtime.   5/8/2020 at 2100   • traMADol (ULTRAM) 50 MG tablet Take 50 mg by mouth Every 6 (Six) Hours As Needed for Moderate Pain .   5/8/2020 at 2100       Medicines:  Current Facility-Administered Medications   Medication Dose Route Frequency Provider Last Rate Last Dose   • acetaminophen (TYLENOL) tablet 650 mg  650 mg Oral Q4H PRN Feliberto Solo MD        Or   • acetaminophen (TYLENOL) suppository 650 mg  650 mg Rectal Q4H PRN Feliberto Solo MD       • amitriptyline (ELAVIL) tablet 25 mg  25 mg Oral Daily Feliberto Solo MD       • bumetanide (BUMEX) 10 mg in sodium chloride 0.9 % 100 mL (0.1 mg/mL) infusion  0.5 mg/hr Intravenous Continuous Kd Poe MD 5 mL/hr at 05/10/20 0613 0.5 mg/hr at 05/10/20 0613   • cefTRIAXone (ROCEPHIN) 1 g/100 mL 0.9% NS (MBP)  1 g Intravenous  Q24H Feliberto Solo MD   1 g at 05/10/20 0448   • cetirizine (zyrTEC) tablet 5 mg  5 mg Oral Daily Feliberto Solo MD       • fluticasone (FLONASE) 50 MCG/ACT nasal spray 2 spray  2 spray Nasal Daily PRN Feliberto Solo MD       • iron sucrose (VENOFER) 100 mg in sodium chloride 0.9 % 100 mL IVPB  100 mg Intravenous Q24H Hay Arora DO       • metoprolol succinate XL (TOPROL-XL) 24 hr tablet 50 mg  50 mg Oral Daily Feliberto Solo MD       • metoprolol tartrate (LOPRESSOR) injection 5 mg  5 mg Intravenous Q6H PRN Feliberto Solo MD   5 mg at 05/10/20 0441   • ondansetron (ZOFRAN) tablet 4 mg  4 mg Oral Q6H PRN Feliberto Solo MD        Or   • ondansetron (ZOFRAN) injection 4 mg  4 mg Intravenous Q6H PRN Feliberto Solo MD       • pantoprazole (PROTONIX) EC tablet 40 mg  40 mg Oral Q AM Feliberto Solo MD   40 mg at 05/10/20 0612   • rOPINIRole (REQUIP) tablet 1 mg  1 mg Oral Daily Feliberto Solo MD       • sodium chloride 0.9 % flush 10 mL  10 mL Intravenous Q12H Feliberto Solo MD       • sodium chloride 0.9 % flush 10 mL  10 mL Intravenous PRN Feliberto Solo MD           Past Medical History:  Past Medical History:   Diagnosis Date   • Allergic rhinitis    • Anemia    • Depression    • Fatty liver    • GERD (gastroesophageal reflux disease)    • Hypertension    • Raynaud's disease    • Raynaud's disease 2013   • Rheumatoid arthritis (CMS/HCC)    • Sjogren's syndrome (CMS/HCC)    • Venous stasis ulcer (CMS/HCC) 2018       Past Surgical History:  Past Surgical History:   Procedure Laterality Date   • ADENOIDECTOMY     • LIVER BIOPSY     • TONSILLECTOMY     • TONSILLECTOMY Bilateral 1976       Family History  Family History   Problem Relation Age of Onset   • Kidney failure Mother    • Hypertension Mother    • Heart disease Father    • Hypertension Father    • Hypertension Brother        Social History  Social History     Socioeconomic History   • Marital status: Single      "Spouse name: Not on file   • Number of children: Not on file   • Years of education: Not on file   • Highest education level: Not on file   Tobacco Use   • Smoking status: Never Smoker   Substance and Sexual Activity   • Alcohol use: Never     Frequency: Never   • Drug use: Never   • Sexual activity: Defer         Review of Systems:  History obtained from chart review and the patient  General ROS: + fever, no chills  Respiratory ROS: + cough, +shortness of breath, -wheezing  Cardiovascular ROS: no chest pain but dyspnea on exertion  Gastrointestinal ROS: + abdominal pain,no melena  Genito-Urinary ROS: No dysuria or hematuria  14 point ROS reviewed with the patient and negative except as noted above and in the HPI unless unable to obtain.    Objective:  /98   Pulse 101   Temp 98.7 °F (37.1 °C) (Axillary)   Resp (!) 30   Ht 160 cm (63\")   Wt 84.7 kg (186 lb 11.2 oz)   LMP 03/09/2020 (Approximate)   SpO2 100%   Breastfeeding No   BMI 33.07 kg/m²     Intake/Output Summary (Last 24 hours) at 5/10/2020 0755  Last data filed at 5/10/2020 0613  Gross per 24 hour   Intake 128 ml   Output 350 ml   Net -222 ml     General: awake/alert    Head: atraumatic, normocephalic  Neck: no cervical masses, no JVD  Chest: Bilateral air entry, scattered end inspiratory rales, decreased breath sounds at the bases  CVS: regular rate and rhythm  Abdominal: soft, nontender, normal bowel sounds  Extremities: no cyanosis or edema  Skin: several spots of erythema and hyperpigmentation (chronic over legs and feet)  Neuro: No focal motor deficits  Musculoskeletal: No obvious joint effusions    Labs:  Lab Results (last 72 hours)     Procedure Component Value Units Date/Time    Osmolality, Urine - Urine, Clean Catch [[id number]]  (Abnormal) Collected:  05/10/20 0641    Specimen:  Urine, Clean Catch Updated:  05/10/20 0755     Osmolality, Urine 342 mOsm/kg     Protein, Urine, Random - Urine, Clean Catch [[id number]] Collected:  05/10/20 " 0641    Specimen:  Urine, Clean Catch Updated:  05/10/20 0725     Total Protein, Urine 206.1 mg/dL     Narrative:       Reference intervals for random urine have not been established.  Clinical usage is dependent upon physician's interpretation in combination with other laboratory tests.       Sodium, Urine, Random - Urine, Clean Catch [[id number]] Collected:  05/10/20 0641    Specimen:  Urine, Clean Catch Updated:  05/10/20 0714     Sodium, Urine 72 mmol/L     Narrative:       Reference intervals for random urine have not been established.  Clinical usage is dependent upon physician's interpretation in combination with other laboratory tests.       Creatinine, Urine, Random - Urine, Clean Catch [[id number]] Collected:  05/10/20 0642    Specimen:  Urine, Clean Catch Updated:  05/10/20 0642    Basic Metabolic Panel [[id number]]  (Abnormal) Collected:  05/10/20 0456    Specimen:  Blood Updated:  05/10/20 0527     Glucose 121 mg/dL      BUN 71 mg/dL      Creatinine 6.62 mg/dL      Sodium 135 mmol/L      Potassium 3.3 mmol/L      Chloride 97 mmol/L      CO2 18.0 mmol/L      Calcium 9.2 mg/dL      eGFR   Amer --     Comment: <15 Indicative of kidney failure.        eGFR Non African Amer 7 mL/min/1.73      Comment: <15 Indicative of kidney failure.        BUN/Creatinine Ratio 10.7     Anion Gap 20.0 mmol/L     Narrative:       GFR Normal >60  Chronic Kidney Disease <60  Kidney Failure <15      Hemoglobin & Hematocrit, Blood [[id number]]  (Abnormal) Collected:  05/10/20 0456    Specimen:  Blood Updated:  05/10/20 0509     Hemoglobin 7.1 g/dL      Hematocrit 21.7 %     Iron Profile [[id number]]  (Abnormal) Collected:  05/10/20 0335    Specimen:  Blood Updated:  05/10/20 0506     Iron 29 mcg/dL      Iron Saturation 9 %      Transferrin 220 mg/dL      TIBC 328 mcg/dL     Ferritin [[id number]]  (Abnormal) Collected:  05/10/20 0335    Specimen:  Blood Updated:  05/10/20 0506     Ferritin 256.90 ng/mL     Narrative:        Results may be falsely decreased if patient taking Biotin.      T4, Free [[id number]]  (Normal) Collected:  05/10/20 0335    Specimen:  Blood Updated:  05/10/20 0506     Free T4 1.20 ng/dL     Narrative:       Results may be falsely increased if patient taking Biotin.      TSH [[id number]]  (Abnormal) Collected:  05/10/20 0335    Specimen:  Blood Updated:  05/10/20 0506     TSH 4.590 uIU/mL      Comment: TSH results may be falsely decreased if patient taking Biotin.       Lipid Panel [[id number]]  (Abnormal) Collected:  05/10/20 0335    Specimen:  Blood Updated:  05/10/20 0506     Total Cholesterol 191 mg/dL      Triglycerides 165 mg/dL      HDL Cholesterol 40 mg/dL      LDL Cholesterol  118 mg/dL      VLDL Cholesterol 33 mg/dL      LDL/HDL Ratio 2.95    Narrative:       Cholesterol Reference Ranges  (U.S. Department of Health and Human Services ATP III Classifications)    Desirable          <200 mg/dL  Borderline High    200-239 mg/dL  High Risk          >240 mg/dL      Triglyceride Reference Ranges  (U.S. Department of Health and Human Services ATP III Classifications)    Normal           <150 mg/dL  Borderline High  150-199 mg/dL  High             200-499 mg/dL  Very High        >500 mg/dL    HDL Reference Ranges  (U.S. Department of Health and Human Services ATP III Classifcations)    Low     <40 mg/dl (major risk factor for CHD)  High    >60 mg/dl ('negative' risk factor for CHD)        LDL Reference Ranges  (U.S. Department of Health and Human Services ATP III Classifcations)    Optimal          <100 mg/dL  Near Optimal     100-129 mg/dL  Borderline High  130-159 mg/dL  High             160-189 mg/dL  Very High        >189 mg/dL    Lipase [[id number]]  (Normal) Collected:  05/10/20 0335    Specimen:  Blood Updated:  05/10/20 0506     Lipase 43 U/L     Amylase [[id number]]  (Normal) Collected:  05/10/20 0335    Specimen:  Blood Updated:  05/10/20 0506     Amylase 47 U/L     C-reactive Protein [[id number]]  (Abnormal)  Collected:  05/10/20 0335    Specimen:  Blood Updated:  05/10/20 0506     C-Reactive Protein 16.36 mg/dL     Protein Elec + Interp, Serum [[id number]] Collected:  05/10/20 0456    Specimen:  Blood Updated:  05/10/20 0505    Glomerular Basement Membrane Antibodies [[id number]] Collected:  05/10/20 0456    Specimen:  Blood Updated:  05/10/20 0505    Antistreptolysin O Titer [[id number]] Collected:  05/10/20 0456    Specimen:  Blood Updated:  05/10/20 0505    AMY [[id number]] Collected:  05/10/20 0456    Specimen:  Blood Updated:  05/10/20 0505    Anti-DNA Antibody, Double-stranded [[id number]] Collected:  05/10/20 0456    Specimen:  Blood Updated:  05/10/20 0505    Vitamin B12 [[id number]] Collected:  05/10/20 0456    Specimen:  Blood Updated:  05/10/20 0505    Folate [[id number]] Collected:  05/10/20 0456    Specimen:  Blood Updated:  05/10/20 0505    C3 Complement [[id number]] Collected:  05/10/20 0456    Specimen:  Blood Updated:  05/10/20 0505    C4 Complement [[id number]] Collected:  05/10/20 0456    Specimen:  Blood Updated:  05/10/20 0505    Rheumatoid Factor [[id number]] Collected:  05/10/20 0456    Specimen:  Blood Updated:  05/10/20 0505    Reticulocytes [[id number]]  (Abnormal) Collected:  05/10/20 0057    Specimen:  Blood Updated:  05/10/20 0437     Reticulocyte % 2.51 %      Reticulocyte Absolute 0.0758 10*6/mm3     Troponin [[id number]]  (Abnormal) Collected:  05/10/20 0335    Specimen:  Blood Updated:  05/10/20 0413     Troponin T 0.115 ng/mL     Narrative:       Troponin T Reference Range:  <= 0.03 ng/mL-   Negative for AMI  >0.03 ng/mL-     Abnormal for myocardial necrosis.  Clinicians would have to utilize clinical acumen, EKG, Troponin and serial changes to determine if it is an Acute Myocardial Infarction or myocardial injury due to an underlying chronic condition.       Results may be falsely decreased if patient taking Biotin.      Phosphorus [[id number]]  (Abnormal) Collected:  05/10/20 0057    Specimen:   Blood Updated:  05/10/20 0335     Phosphorus 5.9 mg/dL     Magnesium [[id number]]  (Normal) Collected:  05/10/20 0057    Specimen:  Blood Updated:  05/10/20 0335     Magnesium 1.9 mg/dL     Blood Gas, Arterial With Co-Ox [[id number]]  (Abnormal) Collected:  05/10/20 0253    Specimen:  Arterial Blood Updated:  05/10/20 0301     Site Left Radial     Reji's Test Positive     pH, Arterial 7.427 pH units      pCO2, Arterial 32.3 mm Hg      Comment: 84 Value below reference range        pO2, Arterial 90.0 mm Hg      HCO3, Arterial 21.2 mmol/L      Base Excess, Arterial -2.8 mmol/L      Comment: 84 Value below reference range        O2 Saturation, Arterial 97.5 %      Hemoglobin, Blood Gas 7.4 g/dL      Comment: 84 Value below reference range        Hematocrit, Blood Gas 22.8 %      Comment: 84 Value below reference range        Oxyhemoglobin 95.8 %      Methemoglobin 0.30 %      Carboxyhemoglobin 1.4 %      A-a Gradiant 157.7 mmHg      Temperature 37.0 C      Sodium, Arterial 137 mmol/L      Potassium, Arterial 3.3 mmol/L      Comment: 84 Value below reference range        Barometric Pressure for Blood Gas 755 mmHg      Modality BiPap     FIO2 40 %      Ventilator Mode NA     Set [facility] Resp Rate 16.0     IPAP 12     Comment: Meter: [id number]     :  [id number]        EPAP 6     Note --     Collected by [id number]     pH, Temp Corrected --     pCO2, Temperature Corrected --     pO2, Temperature Corrected --    BNP [[id number]]  (Abnormal) Collected:  05/10/20 0057    Specimen:  Blood Updated:  05/10/20 0146     proBNP >70,000.0 pg/mL     Narrative:       Among patients with dyspnea, NT-proBNP is highly sensitive for the detection of acute congestive heart failure. In addition NT-proBNP of <300 pg/ml effectively rules out acute congestive heart failure with 99% negative predictive value.    Results may be falsely decreased if patient taking Biotin.      Manual Differential [[id number]]  (Abnormal) Collected:  05/10/20  0057    Specimen:  Blood Updated:  05/10/20 0141     Neutrophil % 82.0 %      Lymphocyte % 11.0 %      Monocyte % 3.0 %      Eosinophil % 2.0 %      Basophil % 1.0 %      Atypical Lymphocyte % 1.0 %      Neutrophils Absolute 13.36 10*3/mm3      Lymphocytes Absolute 1.79 10*3/mm3      Monocytes Absolute 0.49 10*3/mm3      Eosinophils Absolute 0.33 10*3/mm3      Basophils Absolute 0.16 10*3/mm3      nRBC 1.0 /100 WBC      Microcytes Slight/1+     Polychromasia Large/3+     RBC Fragments Mod/2+     Rouleaux Slight/1+     WBC Morphology Normal     Platelet Morphology Normal    CBC & Differential [[id number]] Collected:  05/10/20 0057    Specimen:  Blood Updated:  05/10/20 0130    Narrative:       The following orders were created for panel order CBC & Differential.  Procedure                               Abnormality         Status                     ---------                               -----------         ------                     CBC Auto Differential[[id number]]         Abnormal            Final result                 Please view results for these tests on the individual orders.    CBC Auto Differential [[id number]]  (Abnormal) Collected:  05/10/20 0057    Specimen:  Blood Updated:  05/10/20 0130     WBC 16.29 10*3/mm3      RBC 3.06 10*6/mm3      Hemoglobin 7.1 g/dL      Hematocrit 22.0 %      MCV 71.9 fL      MCH 23.2 pg      MCHC 32.3 g/dL      RDW 16.8 %      RDW-SD 43.0 fl      MPV 10.0 fL      Platelets 361 10*3/mm3     Protime-INR [[id number]]  (Abnormal) Collected:  05/10/20 0057    Specimen:  Blood Updated:  05/10/20 0130     Protime 16.2 Seconds      INR 1.34    aPTT [[id number]]  (Normal) Collected:  05/10/20 0057    Specimen:  Blood Updated:  05/10/20 0130     PTT 25.6 seconds     D-dimer, Quantitative [[id number]]  (Abnormal) Collected:  05/10/20 0057    Specimen:  Blood Updated:  05/10/20 0130     D-Dimer, Quantitative 5.73 mg/L (FEU)     Narrative:       Reference Range is 0-0.50 mg/L FEU. However, results  <0.50 mg/L FEU tends to rule out DVT or PE. Results >0.50 mg/L FEU are not useful in predicting absence or presence of DVT or PE.      Troponin [[id number]]  (Abnormal) Collected:  05/10/20 0057    Specimen:  Blood Updated:  05/10/20 0129     Troponin T 0.121 ng/mL     Narrative:       Troponin T Reference Range:  <= 0.03 ng/mL-   Negative for AMI  >0.03 ng/mL-     Abnormal for myocardial necrosis.  Clinicians would have to utilize clinical acumen, EKG, Troponin and serial changes to determine if it is an Acute Myocardial Infarction or myocardial injury due to an underlying chronic condition.       Results may be falsely decreased if patient taking Biotin.      Comprehensive Metabolic Panel [[id number]]  (Abnormal) Collected:  05/10/20 0057    Specimen:  Blood Updated:  05/10/20 0125     Glucose 116 mg/dL      BUN 70 mg/dL      Creatinine 6.49 mg/dL      Sodium 135 mmol/L      Potassium 3.1 mmol/L      Chloride 96 mmol/L      CO2 20.0 mmol/L      Calcium 9.2 mg/dL      Total Protein 6.9 g/dL      Albumin 2.70 g/dL      ALT (SGPT) 7 U/L      AST (SGOT) 15 U/L      Alkaline Phosphatase 69 U/L      Total Bilirubin 0.6 mg/dL      eGFR Non African Amer 7 mL/min/1.73      Comment: <15 Indicative of kidney failure.        eGFR   Amer --     Comment: <15 Indicative of kidney failure.        Globulin 4.2 gm/dL      A/G Ratio 0.6 g/dL      BUN/Creatinine Ratio 10.8     Anion Gap 19.0 mmol/L     Narrative:       GFR Normal >60  Chronic Kidney Disease <60  Kidney Failure <15      Blood Gas, Arterial With Co-Ox [[id number]]  (Abnormal) Collected:  05/10/20 0055    Specimen:  Arterial Blood Updated:  05/10/20 0109     Site Left Radial     Reji's Test Positive     pH, Arterial 7.510 pH units      Comment: 83 Value above reference range        pCO2, Arterial 23.6 mm Hg      Comment: 84 Value below reference range        pO2, Arterial 53.1 mm Hg      Comment: 85 Value below critical limit        HCO3, Arterial 18.8 mmol/L       Comment: 84 Value below reference range        Base Excess, Arterial -3.5 mmol/L      Comment: 84 Value below reference range        O2 Saturation, Arterial 91.8 %      Comment: 84 Value below reference range        Hemoglobin, Blood Gas 7.6 g/dL      Comment: 84 Value below reference range        Hematocrit, Blood Gas 23.2 %      Comment: 84 Value below reference range        Oxyhemoglobin 79.9 %      Comment: 84 Value below reference range        Methemoglobin 0.20 %      Carboxyhemoglobin 12.7 %      Comment: 83 Value above reference range        A-a Gradiant 69.1 mmHg      Temperature 37.0 C      Sodium, Arterial 136 mmol/L      Comment: 84 Value below reference range        Potassium, Arterial 3.2 mmol/L      Comment: 84 Value below reference range        Barometric Pressure for Blood Gas 756 mmHg      Modality Room Air     FIO2 21 %      Ventilator Mode NA     Note --     Notified Who DR. PINTO     Notified By [id number]     Notified Time 05/10/2020 01:13     Collected by [id number]     Comment: Meter: [id number]     :  [id number]        pH, Temp Corrected --     pCO2, Temperature Corrected --     pO2, Temperature Corrected --    [facility] In-House SARS-CoV-2, NAAT, NP Swab (NO TRANSPORT MEDIA) - Swab, Nasopharynx [[id number]]  (Normal) Collected:  05/10/20 0013    Specimen:  Swab from Nasopharynx Updated:  05/10/20 0040     COVID19 Not Detected          Radiology:   Imaging Results (Last 72 Hours)     Procedure Component Value Units Date/Time    XR Chest 1 View [[id number]] Resulted:  05/10/20 0738     Updated:  05/10/20 0738    CT Chest Without Contrast [[id number]] Resulted:  05/10/20 0111     Updated:  05/10/20 0120          Culture:  No components found for: WOUNDCUL, 3  No components found for: CSFCUL, 3  No components found for: BC, 3  No components found for: URINECUL, 3      Assessment   -HALI (rule out scleroderma crisis, hypertensive crisis, other glomerulonephritis, also consider AIN and  ATN)  -Hypertension  -Acute respiratory failure  -Possible pneumonitis  -Anemia with iron deficiency  -Hypokalemia  -Respiratory alkalosis  -Possible pulmonary edema (likely nephrogenic in origin)  -Raynaud's disease  -Sjogren's syndrome but also consider possible underlying undiagnosed scleroderma or other autoimmune diseases  -Nausea/ abdominal pain      Plan:  At this stage, I highly suspect a scleroderma crisis. This could be a life threatening condition. Patient will need close monitoring in ICU. Our hospital has no rheumatology coverage on board and this is very much needed now for this patient. I highly suggest a transfer to tertiary medical center. Patient will need urgent pulmonary evaluation. Her pulmonary findings may be related to pneumonitis. Patient has a multisystem/organ involvement. Her Bps have improved and she may also benefit from an ACE inhibitor in the context of scleroderma crisis. Patient appears dry on the outside (no peripheral edema, dry mucosa) but her very elevated pro-BNP and lung infiltrates may be related to cardiac failure. Potassium was supplemented. She was also offered BiPAP and her Pox are stable, mostly above 92%. Will follow up urine test and will get STAT chest xray. Will also follow up on her chest CT result. Bumex IV drip was ordered and patient's output improved.     Critical care time spent was 40 minutes.       Thank you for the consult, we appreciate the opportunity to provide care to your patients.  Feel free to contact me if I can be of any further assistance.      Kd Poe MD  5/10/2020  07:55

## 2020-05-10 NOTE — PROGRESS NOTES
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  She does have a history of multiple rheumatologic conditions previously followed by Dr. Jordana Javire.    Called [facility] at [phone].     Chely at the [facility] transfer center is referring me to 1 of the transfer nurses, Loli.  She will be calling me back shortly.    Hay Arora DO  05/10/20  07:49    Spoke with Loli at [facility].  Vitals and labs given.  She wants us to fax a face sheet to [phone].  I have spoken with the charge nurse, Capri, and she will be doing this.  Loli is also in the process of paging the on-call MICU fellow.    Hay Arora,   05/10/20  08:04    Spoke with Dr. Naeem Magallanes with the pulmonary MICU team at [facility].  He is the critical care fellow.  He has accepted the patient to their service.  Dr. Ovidio Jean Baptiste will be the attending.    Loli is emailing me some documents to fill out prior to transfer.    Transfer summary to follow.    Hay Arora DO  05/10/20  10:23

## 2020-05-10 NOTE — PLAN OF CARE
Pt arrived to unit at approximately 0415am by stretcher. Pt awake, alert, oriented, able to voice needs, moves all ext's. Pt is extremely pale, appears tired and fatigued. Placed on bipap for respiratory support. Admission information completed, Dr. Gibbs was able to get in touch to discuss consultation placed with him on pt's behalf. New orders received for Bumex gtt, which was started at approximatley 0613am. Pt has had 350cc of urine produced, cloudy, concentrated, light yellow urine. Bp elevated on admission (175/100), metoprolol 5mg IV push given which helped blood pressure to decrease, pt is afebrile, complaints of pain related to bp cuff. Rocephin 1gm given on her arrival as well. Pt is stable at this time.

## 2020-05-11 LAB
ALBUMIN SERPL-MCNC: 2.4 G/DL (ref 2.9–4.4)
ALBUMIN/GLOB SERPL: 0.6 {RATIO} (ref 0.7–1.7)
ALPHA1 GLOB FLD ELPH-MCNC: 0.5 G/DL (ref 0–0.4)
ALPHA2 GLOB SERPL ELPH-MCNC: 1 G/DL (ref 0.4–1)
ANA SER QL: POSITIVE
ASO AB SERPL-ACNC: <20 IU/ML (ref 0–200)
B-GLOBULIN SERPL ELPH-MCNC: 0.7 G/DL (ref 0.7–1.3)
DSDNA AB SER-ACNC: <1 IU/ML (ref 0–9)
GAMMA GLOB SERPL ELPH-MCNC: 1.5 G/DL (ref 0.4–1.8)
GLOBULIN SER CALC-MCNC: 3.7 G/DL (ref 2.2–3.9)
Lab: ABNORMAL
Lab: NORMAL
M-SPIKE: 1 G/DL
PROT PATTERN SERPL ELPH-IMP: ABNORMAL
PROT SERPL-MCNC: 6.1 G/DL (ref 6–8.5)

## 2020-05-11 NOTE — PAYOR COMM NOTE
"ADMIT INPT 5-9-20  TRANSFER TO [place] 5-10-20  UR  [phone]    PLEASE NOTE THIS WAS WEEKEND ADMIT SO NOT LATE NOTIFICATION      Chen Shook (49 y.o. Female)     Date of Birth Social Security Number Address Home Phone MRN    1970  [id number] [address] [phone] [id number]    [employer] Marital Status          Other Single       Admission Date Admission Type Admitting Provider Attending Provider Department, Room/Bed    5/9/20 Emergency Hay Arora,   [facility] INTENSIVE CARE, I011/1    Discharge Date Discharge Disposition Discharge Destination        5/10/2020 Transfer to Another Facility              Attending Provider:  (none)   Allergies:  Hydroxychloroquine Sulfate, Meloxicam, Penicillins    Isolation:  None   Infection:  COVID (rule out) (05/10/20)   Code Status:  Prior    Ht:  160 cm (63\")   Wt:  84.7 kg (186 lb 11.2 oz)    Admission Cmt:  None   Principal Problem:  Acute respiratory failure with hypoxia (CMS/HCC) [J96.01]                 Active Insurance as of 5/9/2020     Primary Coverage     Payor Plan Insurance Group Employer/Plan Group    [employer] [place] [employer] [place]ICAID      Payor Plan Address Payor Plan Phone Number Payor Plan Fax Number Effective Dates    [address] [address] [phone]  5/9/2020 - None Entered    [facility] [id number]       Subscriber Name Subscriber Birth Date Member ID       CHEN SHOOK 1970 [id number]                 Emergency Contacts      (Rel.) Home Phone Work Phone Mobile Phone    CHEN KRISHNAN (Mother) [phone] -- [phone]    YFN SPENCER (Relative) [phone] -- [phone]               History & Physical      Feliberto Solo MD at 05/10/20 0354              [facility]  HISTORY AND PHYSICAL    Date of Admission: 5/9/2020  Primary Care Physician: Provider, No Known    Subjective     Chief Complaint: Cannot control blood pressure    History " of Present Illness  Patient is a 49-year-old white female past medical history of Sjogren's syndrome and rheumatoid arthritis.  For the last month she is been having bouts with nausea vomiting and abdominal pain.  She was also found to be anemic with a hemoglobin 9.2 range.  She is been getting an outpatient work-up that included so far and upper and lower endoscopy both of which were unrevealing however they did do biopsies and polypectomies.  Patient supposed to have a HIDA scan in the near future.  She also has been having problems controlling her blood pressure she can concerned tonight because it was 215/112.  She presented to an outlying facility to be evaluated was found to have an elevated blood pressure also found to have a UTI and be in renal failure with a creatinine greater than 6 she had elevated white count of 17.4 hemoglobin was 7.6.  She also had an elevated troponin and D-dimers chest x-ray showed bilateral infiltrates consistent with possible failure she has a BNP at the other facility of 178,239.  D-dimers were greater than 7900.  Troponin was also elevated at 0.24.  EKG showed nothing remarkable.  Patient denies any chest pain.  She has had severe shortness of breath that started approximately 3 days ago to the point where she has difficulty walking across the room without stopping.  She has had a low-grade fever but nothing different from her usual symptoms with Sjogren's.  She used to be on CellCept and prednisone but is been off of those for about 2 years now.  She has no history of renal failure although creatinine was 2.3 approximately 2 weeks ago on outpatient labs.  Her mother of note is on dialysis apparently related to colitis and a partial colectomy.  She states she was evaluated then as well for UTI and did not have 1.  She has had 2 Covid swabs 1 in the ER tonight both of which are negative.  CT scan of the chest was obtained which shows evidence of congestive heart failure and  pleural effusions.  She is still making urine but is darker she said.  She denies weight gain but she has had weight loss with the nausea and vomiting.  She denies significant cough that is productive of sputum she does have a chronic cough from her Sjogren's.  She was initially in some distress respiratory wise however she is been placed on BiPAP now and is resting more comfortably her sats were in the 80s here.  She did get bolused a liter of fluids at the other facility.  On other review systems she denies hemoptysis she denies blood in her urine she denies any melena or bright red blood per rectum.  She denies hematemesis.        Review of Systems   14 point review of systems negative except for as per HPI    Otherwise complete ROS reviewed and negative except as mentioned in the HPI.    Past Medical History:   Past Medical History:   Diagnosis Date   • Allergic rhinitis    • Anemia    • Depression    • Fatty liver    • GERD (gastroesophageal reflux disease)    • Hypertension    • Raynaud's disease    • Rheumatoid arthritis (CMS/HCC)    • Sjogren's syndrome (CMS/HCC)    • Venous stasis ulcer (CMS/HCC) 2018     Past Surgical History:  Past Surgical History:   Procedure Laterality Date   • ADENOIDECTOMY     • LIVER BIOPSY     • TONSILLECTOMY       Social History:  reports that she has never smoked. She does not have any smokeless tobacco history on file. She reports that she does not drink alcohol or use drugs.    Family History: family history includes Heart disease in her father; Hypertension in her father and mother; Kidney failure in her mother.       Allergies:  Allergies   Allergen Reactions   • Hydroxychloroquine Sulfate Diarrhea   • Meloxicam Hives   • Penicillins Diarrhea     Medications:  Prior to Admission medications    Medication Sig Start Date End Date Taking? Authorizing Provider   amitriptyline (ELAVIL) 25 MG tablet Take 25 mg by mouth Daily.   Yes Provider, MD Shabnam   cetirizine (zyrTEC) 10  "MG tablet Take 10 mg by mouth Daily.   Yes Shabnam Beach MD   famotidine (PEPCID) 40 MG tablet Take 40 mg by mouth 2 (Two) Times a Day.   Yes Shabnam Beach MD   fluticasone (FLONASE) 50 MCG/ACT nasal spray 2 sprays into the nostril(s) as directed by provider Daily As Needed for Rhinitis.   Yes Shabnam Beach MD   metoprolol succinate XL (TOPROL-XL) 50 MG 24 hr tablet Take 50 mg by mouth Daily.   Yes Shabnam Beach MD   omeprazole (priLOSEC) 20 MG capsule Take 20 mg by mouth 2 (Two) Times a Day.   Yes Shabnam Beach MD   ondansetron (ZOFRAN) 4 MG tablet Take 4 mg by mouth Daily As Needed for Nausea or Vomiting.   Yes Shabnam Beach MD   rizatriptan (MAXALT) 10 MG tablet Take 10 mg by mouth 1 (One) Time As Needed for Migraine. May repeat in 2 hours if needed   Yes Shabnam Beach MD   rOPINIRole (REQUIP) 1 MG tablet Take 1 mg by mouth Daily. Take 1 hour before bedtime.   Yes Shabnam Beach MD   traMADol (ULTRAM) 50 MG tablet Take 50 mg by mouth Every 6 (Six) Hours As Needed for Moderate Pain .   Yes Shabnam Beach MD     Objective     Vital Signs: /100   Pulse 110   Temp 98.8 °F (37.1 °C) (Axillary)   Resp (!) 30   Ht 160 cm (63\")   Wt 81.6 kg (180 lb)   LMP 03/09/2020 (Approximate)   SpO2 98%   Breastfeeding No   BMI 31.89 kg/m²    Physical Exam  Gen.: Ill-appearing white female moderately short of breath but resting comfortably on BiPAP  HEENT: Atraumatic, normocephalic.  Pupils equal, round, and reactive to light. Extraocular movements intact.  Sclerae anicteric.  External ears negative.  Mucous membranes moist.  Neck is supple without lymphadenopathy.  Positive JVD is noted.  No carotid bruits are auscultated.  Oropharynx is without erythema or exudate.   Chest: Rales bilaterally diffuse.  CV: Regular rate and rhythm.  Normal S1-S2.  S3-S4 gallops, murmurs. or rubs.  Abdomen: Soft, nontender, nondistended.  Active bowel sounds,  No " hepatosplenomegaly.  No masses.  No hernias.  Extremities: No clubbing, 1+ edema, or cyanosis.  Capillary refill is normal.  Pulses are 2+ and symmetric at radial and dorsalis pedis.  Posterior tibial pulses are intact and 2+ palpable.  Neuro: Patient is awake, alert, and oriented ×3.  Cranial nerves II through XII are grossly intact.  Motor is 5 out of 5 bilaterally.  DTRs are 2+ and symmetric bilaterally. Sensory exam is nonfocal  Skin: Warm, dry, and intact.  No evidence of breakdown.  Sensorium: Normal thought and affect    Nursing notes and vital signs reviewed        Results Reviewed:  Lab Results (last 24 hours)     Procedure Component Value Units Date/Time    Troponin [[id number]] Collected:  05/10/20 0335    Specimen:  Blood Updated:  05/10/20 0347    Phosphorus [[id number]]  (Abnormal) Collected:  05/10/20 0057    Specimen:  Blood Updated:  05/10/20 0335     Phosphorus 5.9 mg/dL     Magnesium [[id number]]  (Normal) Collected:  05/10/20 0057    Specimen:  Blood Updated:  05/10/20 0335     Magnesium 1.9 mg/dL     Blood Gas, Arterial With Co-Ox [[id number]]  (Abnormal) Collected:  05/10/20 0253    Specimen:  Arterial Blood Updated:  05/10/20 0301     Site Left Radial     Reji's Test Positive     pH, Arterial 7.427 pH units      pCO2, Arterial 32.3 mm Hg      Comment: 84 Value below reference range        pO2, Arterial 90.0 mm Hg      HCO3, Arterial 21.2 mmol/L      Base Excess, Arterial -2.8 mmol/L      Comment: 84 Value below reference range        O2 Saturation, Arterial 97.5 %      Hemoglobin, Blood Gas 7.4 g/dL      Comment: 84 Value below reference range        Hematocrit, Blood Gas 22.8 %      Comment: 84 Value below reference range        Oxyhemoglobin 95.8 %      Methemoglobin 0.30 %      Carboxyhemoglobin 1.4 %      A-a Gradiant 157.7 mmHg      Temperature 37.0 C      Sodium, Arterial 137 mmol/L      Potassium, Arterial 3.3 mmol/L      Comment: 84 Value below reference range        Barometric Pressure  for Blood Gas 755 mmHg      Modality BiPap     FIO2 40 %      Ventilator Mode NA     Set [facility] Resp Rate 16.0     IPAP 12     Comment: Meter: [id number]     :  [id number]        EPAP 6     Note --     Collected by [id number]     pH, Temp Corrected --     pCO2, Temperature Corrected --     pO2, Temperature Corrected --    BNP [[id number]]  (Abnormal) Collected:  05/10/20 0057    Specimen:  Blood Updated:  05/10/20 0146     proBNP >70,000.0 pg/mL     Narrative:       Among patients with dyspnea, NT-proBNP is highly sensitive for the detection of acute congestive heart failure. In addition NT-proBNP of <300 pg/ml effectively rules out acute congestive heart failure with 99% negative predictive value.    Results may be falsely decreased if patient taking Biotin.      Manual Differential [[id number]]  (Abnormal) Collected:  05/10/20 0057    Specimen:  Blood Updated:  05/10/20 0141     Neutrophil % 82.0 %      Lymphocyte % 11.0 %      Monocyte % 3.0 %      Eosinophil % 2.0 %      Basophil % 1.0 %      Atypical Lymphocyte % 1.0 %      Neutrophils Absolute 13.36 10*3/mm3      Lymphocytes Absolute 1.79 10*3/mm3      Monocytes Absolute 0.49 10*3/mm3      Eosinophils Absolute 0.33 10*3/mm3      Basophils Absolute 0.16 10*3/mm3      nRBC 1.0 /100 WBC      Microcytes Slight/1+     Polychromasia Large/3+     RBC Fragments Mod/2+     Rouleaux Slight/1+     WBC Morphology Normal     Platelet Morphology Normal    CBC & Differential [[id number]] Collected:  05/10/20 0057    Specimen:  Blood Updated:  05/10/20 0130    Narrative:       The following orders were created for panel order CBC & Differential.  Procedure                               Abnormality         Status                     ---------                               -----------         ------                     CBC Auto Differential[[id number]]         Abnormal            Final result                 Please view results for these tests on the individual orders.    CBC  Auto Differential [[id number]]  (Abnormal) Collected:  05/10/20 0057    Specimen:  Blood Updated:  05/10/20 0130     WBC 16.29 10*3/mm3      RBC 3.06 10*6/mm3      Hemoglobin 7.1 g/dL      Hematocrit 22.0 %      MCV 71.9 fL      MCH 23.2 pg      MCHC 32.3 g/dL      RDW 16.8 %      RDW-SD 43.0 fl      MPV 10.0 fL      Platelets 361 10*3/mm3     Protime-INR [[id number]]  (Abnormal) Collected:  05/10/20 0057    Specimen:  Blood Updated:  05/10/20 0130     Protime 16.2 Seconds      INR 1.34    aPTT [[id number]]  (Normal) Collected:  05/10/20 0057    Specimen:  Blood Updated:  05/10/20 0130     PTT 25.6 seconds     D-dimer, Quantitative [[id number]]  (Abnormal) Collected:  05/10/20 0057    Specimen:  Blood Updated:  05/10/20 0130     D-Dimer, Quantitative 5.73 mg/L (FEU)     Narrative:       Reference Range is 0-0.50 mg/L FEU. However, results <0.50 mg/L FEU tends to rule out DVT or PE. Results >0.50 mg/L FEU are not useful in predicting absence or presence of DVT or PE.      Troponin [[id number]]  (Abnormal) Collected:  05/10/20 0057    Specimen:  Blood Updated:  05/10/20 0129     Troponin T 0.121 ng/mL     Narrative:       Troponin T Reference Range:  <= 0.03 ng/mL-   Negative for AMI  >0.03 ng/mL-     Abnormal for myocardial necrosis.  Clinicians would have to utilize clinical acumen, EKG, Troponin and serial changes to determine if it is an Acute Myocardial Infarction or myocardial injury due to an underlying chronic condition.       Results may be falsely decreased if patient taking Biotin.      Comprehensive Metabolic Panel [[id number]]  (Abnormal) Collected:  05/10/20 0057    Specimen:  Blood Updated:  05/10/20 0125     Glucose 116 mg/dL      BUN 70 mg/dL      Creatinine 6.49 mg/dL      Sodium 135 mmol/L      Potassium 3.1 mmol/L      Chloride 96 mmol/L      CO2 20.0 mmol/L      Calcium 9.2 mg/dL      Total Protein 6.9 g/dL      Albumin 2.70 g/dL      ALT (SGPT) 7 U/L      AST (SGOT) 15 U/L      Alkaline Phosphatase 69 U/L         Total Bilirubin 0.6 mg/dL      eGFR Non African Amer 7 mL/min/1.73      Comment: <15 Indicative of kidney failure.        eGFR   Amer --     Comment: <15 Indicative of kidney failure.        Globulin 4.2 gm/dL      A/G Ratio 0.6 g/dL      BUN/Creatinine Ratio 10.8     Anion Gap 19.0 mmol/L     Narrative:       GFR Normal >60  Chronic Kidney Disease <60  Kidney Failure <15      Blood Gas, Arterial With Co-Ox [[id number]]  (Abnormal) Collected:  05/10/20 0055    Specimen:  Arterial Blood Updated:  05/10/20 0109     Site Left Radial     Reji's Test Positive     pH, Arterial 7.510 pH units      Comment: 83 Value above reference range        pCO2, Arterial 23.6 mm Hg      Comment: 84 Value below reference range        pO2, Arterial 53.1 mm Hg      Comment: 85 Value below critical limit        HCO3, Arterial 18.8 mmol/L      Comment: 84 Value below reference range        Base Excess, Arterial -3.5 mmol/L      Comment: 84 Value below reference range        O2 Saturation, Arterial 91.8 %      Comment: 84 Value below reference range        Hemoglobin, Blood Gas 7.6 g/dL      Comment: 84 Value below reference range        Hematocrit, Blood Gas 23.2 %      Comment: 84 Value below reference range        Oxyhemoglobin 79.9 %      Comment: 84 Value below reference range        Methemoglobin 0.20 %      Carboxyhemoglobin 12.7 %      Comment: 83 Value above reference range        A-a Gradiant 69.1 mmHg      Temperature 37.0 C      Sodium, Arterial 136 mmol/L      Comment: 84 Value below reference range        Potassium, Arterial 3.2 mmol/L      Comment: 84 Value below reference range        Barometric Pressure for Blood Gas 756 mmHg      Modality Room Air     FIO2 21 %      Ventilator Mode NA     Note --     Notified Who DR. PINTO     Notified By [id number]     Notified Time 05/10/2020 01:13     Collected by [id number]     Comment: Meter: [id number]     :  [id number]        pH, Temp Corrected --     pCO2, Temperature  Corrected --     pO2, Temperature Corrected --    [facility] In-House SARS-CoV-2, NAAT, NP Swab (NO TRANSPORT MEDIA) - Swab, Nasopharynx [[id number]]  (Normal) Collected:  05/10/20 0013    Specimen:  Swab from Nasopharynx Updated:  05/10/20 0040     COVID19 Not Detected        Imaging Results (Last 24 Hours)     Procedure Component Value Units Date/Time    CT Chest Without Contrast [[id number]] Resulted:  05/10/20 0111     Updated:  05/10/20 0120        I have personally reviewed and interpreted the radiology studies and ECG obtained at time of admission.     Assessment / Plan     Assessment:   Active Hospital Problems    Diagnosis   • **Volume overload   • Congestive heart failure (CMS/HCC)   • HALI (acute kidney injury) (CMS/HCC)   • Anemia   • Rheumatoid arthritis (CMS/HCC)   • Edema   • Acute respiratory failure with hypoxia (CMS/HCC)   • UTI (urinary tract infection), bacterial   • Hypokalemia   • Sjogren's syndrome (CMS/HCC)   1.  Volume overload uncertain if this is congestive heart failure causing renal failure or renal failure causing congestive heart failure I suspect it is renal failure causing volume overload.  Have attempted to contact nephrology about possible dialysis we are still waiting on their call back.  In the meantime we will give patient some IV Las[facility]  I suspect the liter bolus she got at the other facility worsened her respiratory status because she did not seem to have as much problem there and was on room air.  Here her sats are in the 80s with out BiPAP.  Will consult nephrology check renal ultrasound check 2D echo.  2.  Congestive heart failure will check 2D echo try to diurese as well as we can consult cardiology as well trend troponins serial EKGs.  3.  Acute kidney injury uncertain as to if this is been brought on by nausea and vomiting I suspect she has other autoimmune issues causing as this may be why she is been having the nausea and vomiting.  Will  check renal ultrasound to evaluate for obstruction treat her urinary tract infection consult nephrology check AMY C3-C4 urine for eosinophils.  She may need steroids if indeed it is an autoimmune process.  Of note her mother is on dialysis also but not for autoimmune reasons.  4.  Autoimmune disorder patient has diagnoses of both of her amiloride's and rheumatoid arthritis as well as Sjogren's.  She needs to be connected back with a rheumatologist unfortunately we do not have one on staff currently.  If it does turn out that she does need that she may have to be transferred to a higher level of care where rheumatology is available.  Will check AMY C3-C4 sed rate CRP.  5.  UTI she is already received antibiotics at the other facility no point doing blood cultures here over they did cultures will give her IV Rocephin.  6.  Anemia check substrates she did have a negative occult blood at the other facility.  7.  Elevated D-dimers will do venous ultrasounds and order VQ scan in the morning.  I do not believe she is got a PE these are probably elevated due to the renal failure.    Patient seen after midnight         Code Status: Full     I discussed the patient's findings and my recommendations with the patient and ER attending.  She designates her mother as her surrogate healthcare decision maker.    Estimated length of stay greater than 2 nights.    Patient seen and examined by me on May 10, 2020 at 3:45 AM.    Feliberto Solo MD   05/10/20   04:05              Electronically signed by Feliberto Solo MD at 05/10/20 0422          Emergency Department Notes      Jori Novak MD at 05/10/20 0242          Subjective   Ms. Shook is a 49-year-old female with a history significant for Sjogren's syndrome, reasonably well-controlled hypertension and GERD.  Over the last 4-weeks she has become increasingly ill.  She describes nausea and vomiting that is now persistent such that she finds it difficult to keep  anything down.  She had epigastric pain that was ultimately investigated by her primary care physician with a referral to GI for an upper endoscopy and colonoscopy.  She also describes low-grade fevers on and off especially in the initial part of the illness.  She also describes weight loss of 10 to 15 pounds over the last few weeks.  She presented to [facility] because of increasing shortness of breath and general malaise.          Review of Systems   Constitutional: Positive for fatigue and unexpected weight change.   Respiratory: Positive for shortness of breath.    Gastrointestinal: Positive for nausea and vomiting.   All other systems reviewed and are negative.      Past Medical History:   Diagnosis Date   • GERD (gastroesophageal reflux disease)    • Hypertension    • Sjogren's syndrome (CMS/HCC)        No Known Allergies    History reviewed. No pertinent surgical history.    History reviewed. No pertinent family history.    Social History     Socioeconomic History   • Marital status: Single     Spouse name: Not on file   • Number of children: Not on file   • Years of education: Not on file   • Highest education level: Not on file   Tobacco Use   • Smoking status: Never Smoker   Substance and Sexual Activity   • Alcohol use: Never     Frequency: Never   • Drug use: Never   • Sexual activity: Defer           Objective   Physical Exam   Constitutional: She is oriented to person, place, and time. She appears well-developed and well-nourished. She appears distressed.   HENT:   Head: Normocephalic and atraumatic.   Mouth/Throat: Oropharynx is clear and moist.   Eyes: Conjunctivae and EOM are normal.   Neck: Normal range of motion. Neck supple.   Cardiovascular: Regular rhythm, normal heart sounds and intact distal pulses. Tachycardia present.   Pulmonary/Chest: She is in respiratory distress. She has rales.   Abdominal: Soft. Bowel sounds are normal.   Musculoskeletal: Normal range of motion. She exhibits  edema.   Neurological: She is alert and oriented to person, place, and time.   Skin: Skin is warm and dry. Capillary refill takes less than 2 seconds.   Psychiatric: She has a normal mood and affect. Her behavior is normal. Judgment and thought content normal.   Nursing note and vitals reviewed.      Procedures          ED Course                                           MDM  Number of Diagnoses or Management Options     Amount and/or Complexity of Data Reviewed  Clinical lab tests: reviewed and ordered  Tests in the radiology section of CPT®:  ordered and reviewed  Tests in the medicine section of CPT®:  reviewed and ordered    Critical Care  Total time providing critical care: < 30 minutes    Patient Progress  Patient progress: stable      Final diagnoses:   Congestive heart failure, unspecified HF chronicity, unspecified heart failure type (CMS/HCC)   HALI (acute kidney injury) (CMS/[facility])   Symptomatic anemia     The patient was transferred over to our institution ostensibly for because of a positive troponin.  The problems however extend far beyond that.  She is in florid heart failure with a BNP over 70,000, a chest CT that strongly suggest fluid overload and dyspnea that clinically reflects it.  The patient improved quite dramatically with BiPAP.  I also ordered IV Lasix with potassium supplementation (her potassium is 3.1).    The patient has significant renal failure with a creatinine over 6.  I consulted nephrology but the PA is unable to contact the nephrologist on-call currently.  I believe the patient does need urgent dialysis and that this would be the best resolution for her fluid overload.    The patient is also significantly anemic with a hemoglobin of 7.1 and requires a transfusion once her fluid overload status has been stabilized.    I discussed the case with Dr. Solo and we agree that this patient needs admission to the ICU.  He kindly agreed to accept the admission under his care.  The patient  is currently improved but still guarded.       Jori Novak MD  05/10/20 0259      Electronically signed by Jori Novak MD at 05/10/20 0259       Lines, Drains & Airways    Active LDAs     Name:   Placement date:   Placement time:   Site:   Days:    Midline Catheter - Double Lumen 05/10/20 Left Basilic   05/10/20    0941     1    Urethral Catheter Silicone 16 Fr.   05/09/20    2355     1         Inactive LDAs     Name:   Placement date:   Placement time:   Removal date:   Removal time:   Site:   Days:    [REMOVED] Peripheral IV 05/10/20 0000 Right External Jugular   05/10/20    0000    05/10/20    1100    External Jugular   less than 1    [REMOVED] Peripheral IV 05/09/20 2355 Right Forearm   05/09/20    2355    05/10/20    1100    Forearm   less than 1                  Facility-Administered Medications as of 5/10/2020   Medication Dose Route Frequency Provider Last Rate Last Dose   • [COMPLETED] furosemide (LASIX) injection 40 mg  40 mg Intravenous Once Jori Novak MD   40 mg at 05/10/20 0336   • [COMPLETED] lidocaine PF 1% (XYLOCAINE) injection 1 mL  1 mL Injection Once Hay Arora, DO   1 mL at 05/10/20 0933   • [COMPLETED] lisinopril (PRINIVIL,ZESTRIL) tablet 10 mg  10 mg Oral Once Kd Poe MD   10 mg at 05/10/20 0940   • [COMPLETED] ondansetron (ZOFRAN) injection 4 mg  4 mg Intravenous Once Jori Novak MD   4 mg at 05/10/20 0125   • [COMPLETED] potassium chloride (MICRO-K) CR capsule 40 mEq  40 mEq Oral Once Feliberto Solo MD   40 mEq at 05/10/20 0347     Lab Results (last 48 hours)     Procedure Component Value Units Date/Time    Basic Metabolic Panel [[id number]]  (Abnormal) Collected:  05/10/20 1252    Specimen:  Blood Updated:  05/10/20 1334     Glucose 99 mg/dL      BUN 75 mg/dL      Creatinine 6.78 mg/dL      Sodium 135 mmol/L      Potassium 4.0 mmol/L      Comment: Specimen hemolyzed.  Results may be affected.        Chloride 99 mmol/L       CO2 16.0 mmol/L      Calcium 9.1 mg/dL      eGFR   Amer --     Comment: <15 Indicative of kidney failure.        eGFR Non African Amer 6 mL/min/1.73      Comment: <15 Indicative of kidney failure.        BUN/Creatinine Ratio 11.1     Anion Gap 20.0 mmol/L     Narrative:       GFR Normal >60  Chronic Kidney Disease <60  Kidney Failure <15      C4 Complement [[id number]]  (Abnormal) Collected:  05/10/20 0456    Specimen:  Blood Updated:  05/10/20 1252     C4 Complement 3.0 mg/dl     C3 Complement [[id number]]  (Normal) Collected:  05/10/20 0456    Specimen:  Blood Updated:  05/10/20 1251     C3 Complement 143.0 mg/dl     Vitamin B12 [[id number]]  (Abnormal) Collected:  05/10/20 0456    Specimen:  Blood Updated:  05/10/20 1228     Vitamin B-12 1,587 pg/mL     Narrative:       Results may be falsely increased if patient taking Biotin.      Folate [[id number]]  (Normal) Collected:  05/10/20 0456    Specimen:  Blood Updated:  05/10/20 1228     Folate 6.33 ng/mL     Narrative:       Results may be falsely increased if patient taking Biotin.      Rheumatoid Factor [[id number]]  (Abnormal) Collected:  05/10/20 0456    Specimen:  Blood Updated:  05/10/20 1213     Rheumatoid Factor Quantitative 55.4 IU/mL     Creatinine, Urine, Random - Urine, Clean Catch [[id number]] Collected:  05/10/20 0642    Specimen:  Urine, Clean Catch Updated:  05/10/20 1210     Creatinine, Urine 56.6 mg/dL     Narrative:       Reference intervals for random urine have not been established.  Clinical usage is dependent upon physician's interpretation in combination with other laboratory tests.       Osmolality, Urine - Urine, Clean Catch [[id number]]  (Abnormal) Collected:  05/10/20 0641    Specimen:  Urine, Clean Catch Updated:  05/10/20 0755     Osmolality, Urine 342 mOsm/kg     Protein, Urine, Random - Urine, Clean Catch [[id number]] Collected:  05/10/20 0641    Specimen:  Urine, Clean Catch Updated:  05/10/20 0725     Total Protein, Urine 206.1  mg/dL     Narrative:       Reference intervals for random urine have not been established.  Clinical usage is dependent upon physician's interpretation in combination with other laboratory tests.       Sodium, Urine, Random - Urine, Clean Catch [[id number]] Collected:  05/10/20 0641    Specimen:  Urine, Clean Catch Updated:  05/10/20 0714     Sodium, Urine 72 mmol/L     Narrative:       Reference intervals for random urine have not been established.  Clinical usage is dependent upon physician's interpretation in combination with other laboratory tests.       Basic Metabolic Panel [[id number]]  (Abnormal) Collected:  05/10/20 0456    Specimen:  Blood Updated:  05/10/20 0527     Glucose 121 mg/dL      BUN 71 mg/dL      Creatinine 6.62 mg/dL      Sodium 135 mmol/L      Potassium 3.3 mmol/L      Chloride 97 mmol/L      CO2 18.0 mmol/L      Calcium 9.2 mg/dL      eGFR   Amer --     Comment: <15 Indicative of kidney failure.        eGFR Non African Amer 7 mL/min/1.73      Comment: <15 Indicative of kidney failure.        BUN/Creatinine Ratio 10.7     Anion Gap 20.0 mmol/L     Narrative:       GFR Normal >60  Chronic Kidney Disease <60  Kidney Failure <15      Hemoglobin & Hematocrit, Blood [[id number]]  (Abnormal) Collected:  05/10/20 0456    Specimen:  Blood Updated:  05/10/20 0509     Hemoglobin 7.1 g/dL      Hematocrit 21.7 %     Iron Profile [[id number]]  (Abnormal) Collected:  05/10/20 0335    Specimen:  Blood Updated:  05/10/20 0506     Iron 29 mcg/dL      Iron Saturation 9 %      Transferrin 220 mg/dL      TIBC 328 mcg/dL     Ferritin [[id number]]  (Abnormal) Collected:  05/10/20 0335    Specimen:  Blood Updated:  05/10/20 0506     Ferritin 256.90 ng/mL     Narrative:       Results may be falsely decreased if patient taking Biotin.      T4, Free [[id number]]  (Normal) Collected:  05/10/20 0335    Specimen:  Blood Updated:  05/10/20 0506     Free T4 1.20 ng/dL     Narrative:       Results may be falsely increased  if patient taking Biotin.      TSH [[id number]]  (Abnormal) Collected:  05/10/20 0335    Specimen:  Blood Updated:  05/10/20 0506     TSH 4.590 uIU/mL      Comment: TSH results may be falsely decreased if patient taking Biotin.       Lipid Panel [[id number]]  (Abnormal) Collected:  05/10/20 0335    Specimen:  Blood Updated:  05/10/20 0506     Total Cholesterol 191 mg/dL      Triglycerides 165 mg/dL      HDL Cholesterol 40 mg/dL      LDL Cholesterol  118 mg/dL      VLDL Cholesterol 33 mg/dL      LDL/HDL Ratio 2.95    Narrative:       Cholesterol Reference Ranges  (U.[place]. Department of Health and Human Services ATP III Classifications)    Desirable          <200 mg/dL  Borderline High    200-239 mg/dL  High Risk          >240 mg/dL      Triglyceride Reference Ranges  (U.S. Department of Health and Human Services ATP III Classifications)    Normal           <150 mg/dL  Borderline High  150-199 mg/dL  High             200-499 mg/dL  Very High        >500 mg/dL    HDL Reference Ranges  (U.S. Department of Health and Human Services ATP III Classifcations)    Low     <40 mg/dl (major risk factor for CHD)  High    >60 mg/dl ('negative' risk factor for CHD)        LDL Reference Ranges  (U.S. Department of Health and Human Services ATP III Classifcations)    Optimal          <100 mg/dL  Near Optimal     100-129 mg/dL  Borderline High  130-159 mg/dL  High             160-189 mg/dL  Very High        >189 mg/dL    Lipase [[id number]]  (Normal) Collected:  05/10/20 0335    Specimen:  Blood Updated:  05/10/20 0506     Lipase 43 U/L     Amylase [[id number]]  (Normal) Collected:  05/10/20 0335    Specimen:  Blood Updated:  05/10/20 0506     Amylase 47 U/L     C-reactive Protein [[id number]]  (Abnormal) Collected:  05/10/20 0335    Specimen:  Blood Updated:  05/10/20 0506     C-Reactive Protein 16.36 mg/dL     Protein Elec + Interp, Serum [[id number]] Collected:  05/10/20 0456    Specimen:  Blood Updated:  05/10/20 0505    Glomerular  Basement Membrane Antibodies [[id number]] Collected:  05/10/20 0456    Specimen:  Blood Updated:  05/10/20 0505    Antistreptolysin O Titer [[id number]] Collected:  05/10/20 0456    Specimen:  Blood Updated:  05/10/20 0505    AMY [[id number]] Collected:  05/10/20 0456    Specimen:  Blood Updated:  05/10/20 0505    Anti-DNA Antibody, Double-stranded [[id number]] Collected:  05/10/20 0456    Specimen:  Blood Updated:  05/10/20 0505    Reticulocytes [[id number]]  (Abnormal) Collected:  05/10/20 0057    Specimen:  Blood Updated:  05/10/20 0437     Reticulocyte % 2.51 %      Reticulocyte Absolute 0.0758 10*6/mm3     Troponin [[id number]]  (Abnormal) Collected:  05/10/20 0335    Specimen:  Blood Updated:  05/10/20 0413     Troponin T 0.115 ng/mL     Narrative:       Troponin T Reference Range:  <= 0.03 ng/mL-   Negative for AMI  >0.03 ng/mL-     Abnormal for myocardial necrosis.  Clinicians would have to utilize clinical acumen, EKG, Troponin and serial changes to determine if it is an Acute Myocardial Infarction or myocardial injury due to an underlying chronic condition.       Results may be falsely decreased if patient taking Biotin.      Phosphorus [[id number]]  (Abnormal) Collected:  05/10/20 0057    Specimen:  Blood Updated:  05/10/20 0335     Phosphorus 5.9 mg/dL     Magnesium [[id number]]  (Normal) Collected:  05/10/20 0057    Specimen:  Blood Updated:  05/10/20 0335     Magnesium 1.9 mg/dL     Blood Gas, Arterial With Co-Ox [[id number]]  (Abnormal) Collected:  05/10/20 0253    Specimen:  Arterial Blood Updated:  05/10/20 0301     Site Left Radial     Reji's Test Positive     pH, Arterial 7.427 pH units      pCO2, Arterial 32.3 mm Hg      Comment: 84 Value below reference range        pO2, Arterial 90.0 mm Hg      HCO3, Arterial 21.2 mmol/L      Base Excess, Arterial -2.8 mmol/L      Comment: 84 Value below reference range        O2 Saturation, Arterial 97.5 %      Hemoglobin, Blood Gas 7.4 g/dL      Comment: 84 Value  below reference range        Hematocrit, Blood Gas 22.8 %      Comment: 84 Value below reference range        Oxyhemoglobin 95.8 %      Methemoglobin 0.30 %      Carboxyhemoglobin 1.4 %      A-a Gradiant 157.7 mmHg      Temperature 37.0 C      Sodium, Arterial 137 mmol/L      Potassium, Arterial 3.3 mmol/L      Comment: 84 Value below reference range        Barometric Pressure for Blood Gas 755 mmHg      Modality BiPap     FIO2 40 %      Ventilator Mode NA     Set [facility] Resp Rate 16.0     IPAP 12     Comment: Meter: [id number]     :  [id number]        EPAP 6     Note --     Collected by [id number]     pH, Temp Corrected --     pCO2, Temperature Corrected --     pO2, Temperature Corrected --    BNP [[id number]]  (Abnormal) Collected:  05/10/20 0057    Specimen:  Blood Updated:  05/10/20 0146     proBNP >70,000.0 pg/mL     Narrative:       Among patients with dyspnea, NT-proBNP is highly sensitive for the detection of acute congestive heart failure. In addition NT-proBNP of <300 pg/ml effectively rules out acute congestive heart failure with 99% negative predictive value.    Results may be falsely decreased if patient taking Biotin.      Manual Differential [[id number]]  (Abnormal) Collected:  05/10/20 0057    Specimen:  Blood Updated:  05/10/20 0141     Neutrophil % 82.0 %      Lymphocyte % 11.0 %      Monocyte % 3.0 %      Eosinophil % 2.0 %      Basophil % 1.0 %      Atypical Lymphocyte % 1.0 %      Neutrophils Absolute 13.36 10*3/mm3      Lymphocytes Absolute 1.79 10*3/mm3      Monocytes Absolute 0.49 10*3/mm3      Eosinophils Absolute 0.33 10*3/mm3      Basophils Absolute 0.16 10*3/mm3      nRBC 1.0 /100 WBC      Microcytes Slight/1+     Polychromasia Large/3+     RBC Fragments Mod/2+     Rouleaux Slight/1+     WBC Morphology Normal     Platelet Morphology Normal    CBC & Differential [[id number]] Collected:  05/10/20 0057    Specimen:  Blood Updated:  05/10/20 0130    Narrative:       The following orders were  created for panel order CBC & Differential.  Procedure                               Abnormality         Status                     ---------                               -----------         ------                     CBC Auto Differential[[id number]]         Abnormal            Final result                 Please view results for these tests on the individual orders.    CBC Auto Differential [[id number]]  (Abnormal) Collected:  05/10/20 0057    Specimen:  Blood Updated:  05/10/20 0130     WBC 16.29 10*3/mm3      RBC 3.06 10*6/mm3      Hemoglobin 7.1 g/dL      Hematocrit 22.0 %      MCV 71.9 fL      MCH 23.2 pg      MCHC 32.3 g/dL      RDW 16.8 %      RDW-SD 43.0 fl      MPV 10.0 fL      Platelets 361 10*3/mm3     Protime-INR [[id number]]  (Abnormal) Collected:  05/10/20 0057    Specimen:  Blood Updated:  05/10/20 0130     Protime 16.2 Seconds      INR 1.34    aPTT [[id number]]  (Normal) Collected:  05/10/20 0057    Specimen:  Blood Updated:  05/10/20 0130     PTT 25.6 seconds     D-dimer, Quantitative [[id number]]  (Abnormal) Collected:  05/10/20 0057    Specimen:  Blood Updated:  05/10/20 0130     D-Dimer, Quantitative 5.73 mg/L (FEU)     Narrative:       Reference Range is 0-0.50 mg/L FEU. However, results <0.50 mg/L FEU tends to rule out DVT or PE. Results >0.50 mg/L FEU are not useful in predicting absence or presence of DVT or PE.      Troponin [[id number]]  (Abnormal) Collected:  05/10/20 0057    Specimen:  Blood Updated:  05/10/20 0129     Troponin T 0.121 ng/mL     Narrative:       Troponin T Reference Range:  <= 0.03 ng/mL-   Negative for AMI  >0.03 ng/mL-     Abnormal for myocardial necrosis.  Clinicians would have to utilize clinical acumen, EKG, Troponin and serial changes to determine if it is an Acute Myocardial Infarction or myocardial injury due to an underlying chronic condition.       Results may be falsely decreased if patient taking Biotin.      Comprehensive Metabolic Panel [[id number]]  (Abnormal)  Collected:  05/10/20 0057    Specimen:  Blood Updated:  05/10/20 0125     Glucose 116 mg/dL      BUN 70 mg/dL      Creatinine 6.49 mg/dL      Sodium 135 mmol/L      Potassium 3.1 mmol/L      Chloride 96 mmol/L      CO2 20.0 mmol/L      Calcium 9.2 mg/dL      Total Protein 6.9 g/dL      Albumin 2.70 g/dL      ALT (SGPT) 7 U/L      AST (SGOT) 15 U/L      Alkaline Phosphatase 69 U/L      Total Bilirubin 0.6 mg/dL      eGFR Non African Amer 7 mL/min/1.73      Comment: <15 Indicative of kidney failure.        eGFR   Amer --     Comment: <15 Indicative of kidney failure.        Globulin 4.2 gm/dL      A/G Ratio 0.6 g/dL      BUN/Creatinine Ratio 10.8     Anion Gap 19.0 mmol/L     Narrative:       GFR Normal >60  Chronic Kidney Disease <60  Kidney Failure <15      Blood Gas, Arterial With Co-Ox [[id number]]  (Abnormal) Collected:  05/10/20 0055    Specimen:  Arterial Blood Updated:  05/10/20 0109     Site Left Radial     Reji's Test Positive     pH, Arterial 7.510 pH units      Comment: 83 Value above reference range        pCO2, Arterial 23.6 mm Hg      Comment: 84 Value below reference range        pO2, Arterial 53.1 mm Hg      Comment: 85 Value below critical limit        HCO3, Arterial 18.8 mmol/L      Comment: 84 Value below reference range        Base Excess, Arterial -3.5 mmol/L      Comment: 84 Value below reference range        O2 Saturation, Arterial 91.8 %      Comment: 84 Value below reference range        Hemoglobin, Blood Gas 7.6 g/dL      Comment: 84 Value below reference range        Hematocrit, Blood Gas 23.2 %      Comment: 84 Value below reference range        Oxyhemoglobin 79.9 %      Comment: 84 Value below reference range        Methemoglobin 0.20 %      Carboxyhemoglobin 12.7 %      Comment: 83 Value above reference range        A-a Gradiant 69.1 mmHg      Temperature 37.0 C      Sodium, Arterial 136 mmol/L      Comment: 84 Value below reference range        Potassium, Arterial 3.2 mmol/L       Comment: 84 Value below reference range        Barometric Pressure for Blood Gas 756 mmHg      Modality Room Air     FIO2 21 %      Ventilator Mode NA     Note --     Notified Who DR. PINTO     Notified By [id number]     Notified Time 05/10/2020 01:13     Collected by [id number]     Comment: Meter: [id number]     :  [id number]        pH, Temp Corrected --     pCO2, Temperature Corrected --     pO2, Temperature Corrected --    [facility] In-House SARS-CoV-2, NAAT, NP Swab (NO TRANSPORT MEDIA) - Swab, Nasopharynx [[id number]]  (Normal) Collected:  05/10/20 0013    Specimen:  Swab from Nasopharynx Updated:  05/10/20 0040     COVID19 Not Detected          Imaging Results (Last 72 Hours)     Procedure Component Value Units Date/Time    US Venous Doppler Lower Extremity Bilateral (duplex) [[id number]] Collected:  05/10/20 1031     Updated:  05/10/20 1034    Narrative:       History: Swelling       Impression:       Impression: There is no evidence of deep venous thrombosis or  superficial thrombophlebitis of right or left lower extremities.     Comments: Bilateral lower extremity venous duplex exam was performed  using color Doppler flow, Doppler waveform analysis, and grayscale  imaging, with and without compression. There is no evidence of deep  venous thrombosis in the common femoral, superficial femoral, popliteal,  peroneal, anterior tibial, and posterior tibial veins bilaterally. No  thrombus is identified in the saphenofemoral junctions and greater  saphenous veins bilaterally.         This report was finalized on 05/10/2020 10:31 by Dr. Dwight Espinoza MD.    US Renal Bilateral [[id number]] Collected:  05/10/20 1029     Updated:  05/10/20 1033    Narrative:       EXAMINATION:  US RENAL BILATERAL-  5/10/2020 9:50 AM CDT     HISTORY: Acute renal insufficiency. N 17.9.      COMPARISON: No comparison study.     TECHNIQUE: Grayscale and color flow imaging were performed.     FINDINGS: The visualized abdominal aorta and  inferior vena cava are  normal caliber. The right kidney measures 11 cm and the left kidney  measures 10.9 cm. The cortical thickness and echogenicity are within  normal limits. There is no hydronephrosis. The urinary bladder is  decompressed by Joseph catheter.       Impression:       Bilateral renal ultrasound is within normal limits.  This report was finalized on 05/10/2020 10:30 by Dr. Stanley Brewer MD.    XR Chest 1 View [[id number]] Collected:  05/10/20 0812     Updated:  05/10/20 0816    Narrative:       EXAMINATION:  XR CHEST 1 VW-  5/10/2020 7:38 AM CDT     HISTORY: Shortness of air. I50.9-Heart failure, unspecified; N17.9-Acute  kidney failure, unspecified; D64.9-Anemia, unspecified.     COMPARISON: No comparison study.     FINDINGS:  There is cardiomegaly. There are bilateral infiltrates. There  is mild blunting of the costophrenic angles.       Impression:       1. Probable congestive heart failure.  2. Parenchymal infiltrates likely represent pulmonary edema. The  infiltrates can also be related to infection. Correlate clinically.        This report was finalized on 05/10/2020 08:13 by Dr. Stanley Brewer MD.    CT Chest Without Contrast [[id number]] Collected:  05/10/20 0805     Updated:  05/10/20 0815    Narrative:       EXAMINATION:  CT CHEST WO CONTRAST-  5/10/2020 1:11 AM CDT     HISTORY: Severe SOB. I50.9-Heart failure, unspecified; N17.9-Acute  kidney failure, unspecified; D64.9-Anemia, unspecified.     COMPARISON : No comparison study.     DLP: 393 mGy-cm. Automated dosage control was utilized.     TECHNIQUE: Spiral CT was performed of the chest without contrast.  Sagittal and coronal images were reconstructed.     MEDIASTINUM, HEART AND VASCULAR STRUCTURES: The thoracic aorta is normal  in caliber. The main pulmonary artery segment measures 3.3 cm. There is  cardiomegaly. There is a small to moderate pericardial effusion. There  is mediastinal lymphadenopathy. Right paratracheal lymph node  measures  1.9 x 1.7 cm. There are multiple other smaller mediastinal lymph nodes.  There is probable hilar adenopathy but the hilar areas are difficult to  evaluate without contrast.     LUNGS: There are small bilateral pleural effusions. There are bilateral  interstitial as well as alveolar infiltrates.     UPPER ABDOMEN: No acute appearing abnormality in the upper abdomen.     BONES: There are degenerative changes of the spine.       Impression:       1. Cardiomegaly with small to moderate pericardial effusion. Small  bilateral pleural effusions. Interstitial and alveolar infiltrates.  Congestive heart failure most likely. Infectious disease of the lungs  cannot be entirely ruled out.  2. Mediastinal and hilar lymphadenopathy can be seen in patients with  congestive failure. The differential includes infection and neoplasm.  3. Dilated pulmonary arteries.          This report was finalized on 05/10/2020 08:12 by Dr. Stanley Brewer MD.        Orders (last 72 hrs)      Start     Ordered    05/11/20 0600  CBC (No Diff)  Morning Draw,   Status:  Canceled      05/10/20 0722    05/11/20 0600  Comprehensive Metabolic Panel  Morning Draw,   Status:  Canceled      05/10/20 0722    05/11/20 0600  Magnesium  Morning Draw,   Status:  Canceled      05/10/20 0722    05/11/20 0600  Phosphorus  Morning Draw,   Status:  Canceled      05/10/20 0722    05/11/20 0000  cetirizine (zyrTEC) 5 MG tablet  Daily      05/10/20 1037    05/11/20 0000  cefTRIAXone (ROCEPHIN) 1 g/100 mL 0.9% NS (MBP)  Every 24 Hours      05/10/20 1037    05/11/20 0000  iron sucrose 100 mg in sodium chloride 0.9 % 100 mL IVPB  Every 24 Hours      05/10/20 1037    05/10/20 1400  furosemide (LASIX) injection 60 mg  Every 12 Hours,   Status:  Discontinued      05/10/20 0424    05/10/20 1200  Basic Metabolic Panel  Every 6 Hours,   Status:  Canceled      05/10/20 0834    05/10/20 1038  Discontinue IV  Once,   Status:  Canceled      05/10/20 1037    05/10/20 1037   Discharge patient  Once      05/10/20 1037    05/10/20 1030  lidocaine PF 1% (XYLOCAINE) injection 1 mL  Once      05/10/20 0933    05/10/20 0935  Troponin  Now Then Every 6 Hours,   Status:  Canceled     Comments:  Perform 6 Hours After Last Troponin (If Done)      05/10/20 0440    05/10/20 0915  lisinopril (PRINIVIL,ZESTRIL) tablet 10 mg  Once      05/10/20 0826    05/10/20 0912  Eosinophil Smear - Urine, Urine, Clean Catch  Once      05/10/20 0911    05/10/20 0900  amitriptyline (ELAVIL) tablet 25 mg  Daily,   Status:  Discontinued      05/10/20 0424    05/10/20 0900  cetirizine (zyrTEC) tablet 5 mg  Daily,   Status:  Discontinued      05/10/20 0424    05/10/20 0900  metoprolol succinate XL (TOPROL-XL) 24 hr tablet 50 mg  Daily,   Status:  Discontinued      05/10/20 0424    05/10/20 0900  rOPINIRole (REQUIP) tablet 1 mg  Daily,   Status:  Discontinued      05/10/20 0424    05/10/20 0900  sodium chloride 0.9 % flush 10 mL  Every 12 Hours Scheduled,   Status:  Discontinued      05/10/20 0424    05/10/20 0815  iron sucrose (VENOFER) 100 mg in sodium chloride 0.9 % 100 mL IVPB  Every 24 Hours,   Status:  Discontinued      05/10/20 0727    05/10/20 0800  Hemoglobin & Hematocrit, Blood  Every 8 Hours,   Status:  Canceled      05/10/20 0424    05/10/20 0800  STAT Adult Transthoracic Echo Complete W/ Cont if Necessary Per Protocol  Once      05/10/20 0424    05/10/20 0800  US Renal Bilateral  1 Time Imaging      05/10/20 0424    05/10/20 0800  US Venous Doppler Lower Extremity Bilateral (duplex)  1 Time Imaging      05/10/20 0423    05/10/20 0800  NM Lung Ventilation Perfusion  1 Time Imaging,   Status:  Canceled      05/10/20 0423    05/10/20 0800  Midline Consult  STAT     Provider:  (Not yet assigned)    05/10/20 0424    05/10/20 0731  XR Chest 1 View  1 Time Imaging      05/10/20 0731    05/10/20 0702  Inpatient Cardiology Consult  IN AM,   Status:  Canceled     Specialty:  Cardiology  Provider:  (Not yet assigned)     05/10/20 0424    05/10/20 0700  Basic Metabolic Panel  Once      05/10/20 0424    05/10/20 0655  Eosinophil Smear - Urine, Urine, Clean Catch  Once,   Status:  Canceled      05/10/20 0424    05/10/20 0630  bumetanide (BUMEX) 10 mg in sodium chloride 0.9 % 100 mL (0.1 mg/mL) infusion  Continuous,   Status:  Discontinued      05/10/20 0540    05/10/20 0600  pantoprazole (PROTONIX) EC tablet 40 mg  Every Early Morning,   Status:  Discontinued      05/10/20 0424    05/10/20 0600  Strict Intake & Output  Every 6 Hours,   Status:  Canceled      05/10/20 0424    05/10/20 0500  Vital Signs Every Hour and Per Hospital Policy Based on Patient Condition  Every Hour,   Status:  Canceled      05/10/20 0424    05/10/20 0500  Intake and Output  Every Hour,   Status:  Canceled      05/10/20 0424    05/10/20 0500  cefTRIAXone (ROCEPHIN) 1 g/100 mL 0.9% NS (MBP)  Every 24 Hours,   Status:  Discontinued      05/10/20 0424    05/10/20 0435  NIPPV (CPAP or BIPAP)  Until Discontinued,   Status:  Canceled      05/10/20 0434    05/10/20 0425  Cardiac Monitoring  Continuous,   Status:  Canceled      05/10/20 0424    05/10/20 0425  Continuous Pulse Oximetry  Continuous,   Status:  Canceled      05/10/20 0424    05/10/20 0425  Cardiac Monitoring  Continuous,   Status:  Canceled      05/10/20 0424    05/10/20 0425  Vital Signs  Per Hospital Policy,   Status:  Canceled      05/10/20 0424    05/10/20 0425  Strict Bed Rest  Until Discontinued,   Status:  Canceled      05/10/20 0424    05/10/20 0425  Use Mobility Guidelines for Advancement of Activity  Continuous,   Status:  Canceled      05/10/20 0424    05/10/20 0425  Daily Weights  Daily,   Status:  Canceled      05/10/20 0424    05/10/20 0425  Height & Weight  Once,   Status:  Canceled      05/10/20 0424    05/10/20 0425  Daily Weights  Daily,   Status:  Canceled      05/10/20 0424    05/10/20 0425  Oxygen Therapy- Nasal Cannula; Titrate for SPO2: 90% - 95%  Continuous,   Status:  Canceled       05/10/20 0424    05/10/20 0425  Cardiac Rehab Evaluation and Enrollment  Once,   Status:  Canceled     Provider:  (Not yet assigned)    05/10/20 0424    05/10/20 0425  Vitamin B12  STAT      05/10/20 0424    05/10/20 0425  Folate  STAT      05/10/20 0424    05/10/20 0425  Ferritin  STAT      05/10/20 0424    05/10/20 0425  Iron Profile  STAT      05/10/20 0424    05/10/20 0425  Occult Blood X 1, Stool - Stool,  STAT,   Status:  Canceled      05/10/20 0424    05/10/20 0425  Reticulocytes  STAT      05/10/20 0424    05/10/20 0425  Insert Peripheral IV  Once,   Status:  Canceled      05/10/20 0424    05/10/20 0425  Saline Lock & Maintain IV Access  Continuous,   Status:  Canceled      05/10/20 0424    05/10/20 0425  Place Sequential Compression Device  Once,   Status:  Canceled      05/10/20 0424    05/10/20 0425  Maintain Sequential Compression Device  Continuous,   Status:  Canceled      05/10/20 0424    05/10/20 0425  Continuous Pulse Oximetry  Continuous,   Status:  Canceled      05/10/20 0424    05/10/20 0425  Bed Rest  Until Discontinued,   Status:  Canceled      05/10/20 0424    05/10/20 0425  NPO Diet  Diet Effective Now,   Status:  Canceled      05/10/20 0424    05/10/20 0425  Inpatient Nephrology Consult  Once     Specialty:  Nephrology  Provider:  (Not yet assigned)    05/10/20 0424    05/10/20 0425  T4, Free  STAT      05/10/20 0424    05/10/20 0425  TSH  STAT      05/10/20 0424    05/10/20 0425  Lipid Panel  STAT      05/10/20 0424    05/10/20 0425  Lipase  STAT      05/10/20 0424    05/10/20 0425  Amylase  STAT      05/10/20 0424    05/10/20 0425  AMY  Once      05/10/20 0424    05/10/20 0425  C-reactive Protein  Once      05/10/20 0424    05/10/20 0425  Anti-DNA Antibody, Double-stranded  Once      05/10/20 0424    05/10/20 0425  C3 Complement  Once      05/10/20 0424    05/10/20 0425  C4 Complement  Once      05/10/20 0424    05/10/20 0425  Glomerular Basement Membrane Antibodies  Once      05/10/20  0424    05/10/20 0425  Antistreptolysin O Titer  Once      05/10/20 0424    05/10/20 0425  Protein Elec + Interp, Serum  Once      05/10/20 0424    05/10/20 0425  Rheumatoid Factor  Once      05/10/20 0424    05/10/20 0425  Troponin  Now Then Every 6 Hours,   Status:  Canceled     Comments:  Perform 6 Hours After Last Troponin (If Done)      05/10/20 0424    05/10/20 0425  ECG 12 Lead  Now & in 6 Hours,   Status:  Canceled     Comments:  Perform 6 Hours After Last EKG (If Done)    05/10/20 0424    05/10/20 0425  Sodium, Urine, Random - Urine, Clean Catch  Once      05/10/20 0424    05/10/20 0425  Creatinine, Urine, Random - Urine, Clean Catch  Once      05/10/20 0424    05/10/20 0425  Protein, Urine, Random - Urine, Clean Catch  Once      05/10/20 0424    05/10/20 0425  Osmolality, Urine - Urine, Clean Catch  Once      05/10/20 0424    05/10/20 0424  fluticasone (FLONASE) 50 MCG/ACT nasal spray 2 spray  Daily PRN,   Status:  Discontinued      05/10/20 0424    05/10/20 0424  sodium chloride 0.9 % flush 10 mL  As Needed,   Status:  Discontinued      05/10/20 0424    05/10/20 0424  acetaminophen (TYLENOL) tablet 650 mg  Every 4 Hours PRN,   Status:  Discontinued      05/10/20 0424    05/10/20 0424  acetaminophen (TYLENOL) suppository 650 mg  Every 4 Hours PRN,   Status:  Discontinued      05/10/20 0424    05/10/20 0424  metoprolol tartrate (LOPRESSOR) injection 5 mg  Every 6 Hours PRN,   Status:  Discontinued      05/10/20 0424    05/10/20 0424  ondansetron (ZOFRAN) tablet 4 mg  Every 6 Hours PRN,   Status:  Discontinued      05/10/20 0424    05/10/20 0424  ondansetron (ZOFRAN) injection 4 mg  Every 6 Hours PRN,   Status:  Discontinued      05/10/20 0424    05/10/20 0341  Code Status and Medical Interventions:  Continuous,   Status:  Canceled      05/10/20 0353    05/10/20 0337  potassium chloride (MICRO-K) CR capsule 40 mEq  Once      05/10/20 0335    05/10/20 0302  Blood Gas, Arterial With Co-Ox  Once      05/10/20  0253    05/10/20 0300  Inpatient Admission  Once      05/10/20 0301    05/10/20 0253  furosemide (LASIX) injection 40 mg  Once      05/10/20 0251    05/10/20 0253  potassium chloride 20 mEq in 100 mL IVPB  Once,   Status:  Discontinued      05/10/20 0251    05/10/20 0252  Phosphorus  Once      05/10/20 0251    05/10/20 0251  Magnesium  STAT      05/10/20 0251    05/10/20 0228  Blood Gas, Arterial With Co-Ox  STAT      05/10/20 0227    05/10/20 0110  Blood Gas, Arterial With Co-Ox  Once      05/10/20 0055    05/10/20 0106  Manual Differential  Once      05/10/20 0105    05/10/20 0101  D-dimer, Quantitative  Once      05/10/20 0046    05/10/20 0031  ondansetron (ZOFRAN) injection 4 mg  Once      05/10/20 0029    05/10/20 0030  BNP  Once      05/10/20 0029    05/10/20 0028  Type & Screen  STAT      05/10/20 0027    05/10/20 0028  Blood Gas, Arterial With Co-Ox  STAT      05/10/20 0027    05/10/20 0027  CT Chest Without Contrast  1 Time Imaging      05/10/20 0026    05/10/20 0007  CBC & Differential  Once      05/10/20 0006    05/10/20 0007  Comprehensive Metabolic Panel  Once      05/10/20 0006    05/10/20 0007  Protime-INR  Once      05/10/20 0006    05/10/20 0007  aPTT  Once      05/10/20 0006    05/10/20 0007  Troponin  Now Then Every 3 Hours      05/10/20 0006    05/10/20 0007  CBC Auto Differential  PROCEDURE ONCE      05/10/20 0006    05/10/20 0003  [facility] In-House SARS-CoV-2, NAAT, NP Swab (NO TRANSPORT MEDIA) - Swab, Nasopharynx  Once      05/10/20 0002    05/10/20 0000  ECG 12 Lead  Once      05/10/20 0000    05/10/20 0000  pantoprazole (PROTONIX) 40 MG EC tablet  Daily      05/10/20 1037    05/10/20 0000  acetaminophen (TYLENOL) 325 MG tablet  Every 4 Hours PRN      05/10/20 1037    05/10/20 0000  bumetanide 10 mg in sodium chloride 0.9 % 60 mL  Continuous      05/10/20 1037    05/10/20 0000  metoprolol tartrate (LOPRESSOR) 5 MG/5ML injection  Every 6 Hours PRN      05/10/20 1037    05/10/20 0000  ondansetron  (ZOFRAN) 4 MG/2ML injection  Every 6 Hours PRN      05/10/20 1037    05/10/20 0000  Up WIth Assist      05/10/20 1037    05/10/20 0000  Diet: Nothing By Mouth      05/10/20 1037    --  rizatriptan (MAXALT) 10 MG tablet  Once As Needed,   Status:  Discontinued      05/10/20 0021    --  omeprazole (priLOSEC) 20 MG capsule  2 Times Daily,   Status:  Discontinued      05/10/20 0150    --  metoprolol succinate XL (TOPROL-XL) 50 MG 24 hr tablet  Daily      05/10/20 0150    --  ondansetron (ZOFRAN) 4 MG tablet  Daily PRN,   Status:  Discontinued      05/10/20 0150    --  famotidine (PEPCID) 40 MG tablet  2 Times Daily,   Status:  Discontinued      05/10/20 0150    --  fluticasone (FLONASE) 50 MCG/ACT nasal spray  Daily PRN      05/10/20 0150    --  rOPINIRole (REQUIP) 1 MG tablet  Daily      05/10/20 0150    --  amitriptyline (ELAVIL) 25 MG tablet  Daily      05/10/20 0150    --  cetirizine (zyrTEC) 10 MG tablet  Daily,   Status:  Discontinued      05/10/20 0150    --  traMADol (ULTRAM) 50 MG tablet  Every 6 Hours PRN,   Status:  Discontinued      05/10/20 0150    --  SCANNED - LABS      05/09/20 0000    --  SCANNED - IMAGING      05/09/20 0000    --  SCANNED EKG      05/09/20 0000    --  SCANNED - TELEMETRY        05/09/20 0000                Ventilator/Non-Invasive Ventilation Settings (From admission, onward)     Start     Ordered    05/10/20 0435  NIPPV (CPAP or BIPAP)  Until Discontinued,   Status:  Canceled     Question Answer Comment   Type: BIPAP    NIPPV Mask Interface: Per Patient Preference        05/10/20 0434                   Discharge Summary      Hay Arora DO at 05/10/20 1038                [facility] Medicine Services  TRANSFER SUMMARY       Accepting Facility: [facility]  Accepting Physician: Dr. Naeem Magallanes on behalf of Dr. Ovidio Jean Baptiste.    Date of Admission: 5/9/2020  Date of Discharge:  5/10/2020  Primary Care Physician: Annalee Saenz  MD AR    Presenting Problem/History of Present Illness:  Shortness of breath, decreased urine output.     Final Discharge Diagnoses:  Active Hospital Problems    Diagnosis   • **Acute respiratory failure with hypoxia (CMS/[facility])   • Volume overload   • Congestive heart failure (CMS/[facility])   • HALI (acute kidney injury) (CMS/[facility])   • Edema   • Rheumatoid arthritis (CMS/[facility])   • Sjogren's syndrome (CMS/[facility])   • Anemia   • UTI (urinary tract infection), bacterial   • Hypokalemia     Consults: Dr. Poe with nephrology. Other consults were planned, but cancelled due to transfer to [place].     Procedures Performed: None.     Pertinent Test Results:   Results for orders placed during the hospital encounter of 05/09/20   STAT Adult Transthoracic Echo Complete W/ Cont if Necessary Per Protocol    Narrative · Left ventricular systolic function is mildly decreased. EF 45-50%.  · Left ventricular wall thickness is consistent with mild concentric   hypertrophy.  · Mild to moderate aortic valve regurgitation is present.  · Mild-to-moderate mitral valve regurgitation is present  · Left atrial cavity size is dilated.  · Normal size and function of the right ventricle.  · There is a small (<1cm) pericardial effusion without echocardiographic   features of tamponade physiology.        Hospital Course:  This is a 49-year-old  female patient who resides in [place], [place].  She sees Dr. Annalee Saenz for primary care.  She has a history of Sjogren's syndrome and rheumatoid arthritis.  She was previously treated for these conditions by Dr. Jordana Javier with rheumatology.  She was transferred from [facility] late last evening after presenting there with complaints of shortness of breath and decreased urination.  She was found to be in acute congestive heart failure with acute renal failure.  She had a normal serum creatinine of 0.6 in March 2018.    She was evaluated by Dr. Poe with nephrology early  "this morning.  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  He believes that this is causing her picture of acute renal failure and acute congestive heart failure.  He requested me to transfer the patient after seeing her this morning.    I have had multiple phone calls with [facility].  Dr. Naeem Magallanes with the pulmonary MICU service has accepted the patient on behalf of of Dr. Ovidio Jean Baptiste.  Current laboratory testing vitals, and exam reviewed with him.  I told him specifically that we have not initiated steroids yet.  He understands that she will likely require dialysis, but seems to be having some diuresis on a Bumex drip at present.  He was also made aware that the patient did screen negative for COVID-19 by [facility] in-house testing.    Dr. Poe was updated that she is being transferred.    Dr. Solo, the admitting physician, had some concern that she might have a urinary tract infection based on results from her [facility].  She is currently on ceftriaxone.    Physical Exam on Discharge:  BP (!) 150/105   Pulse 102   Temp 98.2 °F (36.8 °C) (Axillary)   Resp (!) 35   Ht 160 cm (63\")   Wt 84.7 kg (186 lb 11.2 oz)   LMP 03/09/2020 (Approximate)   SpO2 100%   Breastfeeding No   BMI 33.07 kg/m²    Physical Exam   Constitutional: She is oriented to person, place, and time.   Up in bed.  No readily identifiable distress.  Nontoxic.  Seen and discussed with her nurse, Terry   HENT:   Head: Normocephalic and atraumatic.   Eyes: Pupils are equal, round, and reactive to light. Conjunctivae and EOM are normal.   Neck: Neck supple. No JVD present.   Cardiovascular: Normal rate, regular rhythm, normal heart sounds and intact distal pulses. Exam reveals no gallop and no friction rub.   No murmur heard.  Slightly tachycardic, sinus. Hypertensive.   Pulmonary/Chest: No respiratory distress. She has no wheezes. She has rales. She " exhibits no tenderness.   Tolerating BiPAP 12/6 on 40% FiO2.   Abdominal: Soft. Bowel sounds are normal. She exhibits no distension. There is no tenderness. There is no rebound and no guarding.   Musculoskeletal: Normal range of motion. She exhibits edema. She exhibits no tenderness or deformity.   Neurological: She is alert and oriented to person, place, and time. She displays normal reflexes. No cranial nerve deficit. She exhibits normal muscle tone.   Skin: Skin is warm and dry. No rash noted.   Psychiatric:   Flat.     Condition on Discharge: Stable for transfer to [place] by ambulance.     Discharge Disposition:  Transfer to Another Facility    Discharge Medications:     Discharge Medications      New Medications      Instructions Start Date   acetaminophen 325 MG tablet  Commonly known as:  TYLENOL   650 mg, Oral, Every 4 Hours PRN      bumetanide 10 mg in sodium chloride 0.9 % 60 mL   0.5 mg/hr (0.5 mg/hr), Intravenous, Continuous      cefTRIAXone  Commonly known as:  ROCEPHIN   1 g, Intravenous, Every 24 Hours   Start Date:  May 11, 2020     iron sucrose 100 mg in sodium chloride 0.9 % 100 mL IVPB   100 mg, Intravenous, Every 24 Hours   Start Date:  May 11, 2020     metoprolol tartrate 5 MG/5ML injection  Commonly known as:  LOPRESSOR   5 mg, Intravenous, Every 6 Hours PRN      ondansetron 4 MG/2ML injection  Commonly known as:  ZOFRAN  Replaces:  ondansetron 4 MG tablet   4 mg, Intravenous, Every 6 Hours PRN      pantoprazole 40 MG EC tablet  Commonly known as:  PROTONIX  Replaces:  omeprazole 20 MG capsule   40 mg, Oral, Daily         Changes to Medications      Instructions Start Date   cetirizine 5 MG tablet  Commonly known as:  zyrTEC  What changed:    · medication strength  · how much to take   5 mg, Oral, Daily   Start Date:  May 11, 2020        Continue These Medications      Instructions Start Date   amitriptyline 25 MG tablet  Commonly known as:  ELAVIL   25 mg, Oral, Daily      fluticasone 50  MCG/ACT nasal spray  Commonly known as:  FLONASE   2 sprays, Nasal, Daily PRN      metoprolol succinate XL 50 MG 24 hr tablet  Commonly known as:  TOPROL-XL   50 mg, Oral, Daily      rOPINIRole 1 MG tablet  Commonly known as:  REQUIP   1 mg, Oral, Daily, Take 1 hour before bedtime.          Stop These Medications    famotidine 40 MG tablet  Commonly known as:  PEPCID     omeprazole 20 MG capsule  Commonly known as:  priLOSEC  Replaced by:  pantoprazole 40 MG EC tablet     ondansetron 4 MG tablet  Commonly known as:  ZOFRAN  Replaced by:  ondansetron 4 MG/2ML injection     rizatriptan 10 MG tablet  Commonly known as:  MAXALT     traMADol 50 MG tablet  Commonly known as:  ULTRAM          Discharge Diet:   Diet Instructions     Diet: Nothing By Mouth      Discharge Diet:  Nothing By Mouth        Activity at Discharge:   Activity Instructions     Up WIth Assist          Follow-up Appointments:   Per Angela once discharged from their facility.  She sees Dr. Annalee Saenz for primary care in [place], [place].    Test Results Pending at Discharge: Multiple serologies and other test as evidenced above.    Hay Arora DO  05/10/20  10:38    Time: 40 minutes.           Electronically signed by Hay Arora DO at 05/10/20 1232

## 2020-05-13 LAB — GBM IGG SER-ACNC: 3 UNITS (ref 0–20)

## 2020-05-29 ENCOUNTER — HOSPITAL ENCOUNTER (OUTPATIENT)
Dept: INFUSION THERAPY | Age: 50
Discharge: HOME | End: 2020-05-29
Payer: MEDICAID

## 2020-05-29 ENCOUNTER — TELEPHONE (OUTPATIENT)
Dept: HEMATOLOGY | Age: 50
End: 2020-05-29

## 2020-05-29 ENCOUNTER — HOSPITAL ENCOUNTER (OUTPATIENT)
Dept: GENERAL RADIOLOGY | Age: 50
Discharge: HOME | End: 2020-05-29
Payer: MEDICAID

## 2020-05-29 ENCOUNTER — OFFICE VISIT (OUTPATIENT)
Dept: HEMATOLOGY | Age: 50
End: 2020-05-29
Payer: MEDICAID

## 2020-05-29 VITALS
TEMPERATURE: 98.8 F | SYSTOLIC BLOOD PRESSURE: 200 MMHG | HEIGHT: 63 IN | WEIGHT: 196.8 LBS | DIASTOLIC BLOOD PRESSURE: 140 MMHG | OXYGEN SATURATION: 91 % | BODY MASS INDEX: 34.87 KG/M2 | HEART RATE: 103 BPM

## 2020-05-29 DIAGNOSIS — R53.83 OTHER FATIGUE: ICD-10-CM

## 2020-05-29 DIAGNOSIS — M35.09 SJOGREN'S SYNDROME WITH OTHER ORGAN INVOLVEMENT (HCC): ICD-10-CM

## 2020-05-29 DIAGNOSIS — R77.8 ABNORMAL SPEP: ICD-10-CM

## 2020-05-29 DIAGNOSIS — D64.9 NORMOCYTIC ANEMIA: ICD-10-CM

## 2020-05-29 DIAGNOSIS — L97.211 NON-PRESSURE CHRONIC ULCER OF RIGHT CALF, LIMITED TO BREAKDOWN OF SKIN (HCC): Chronic | ICD-10-CM

## 2020-05-29 DIAGNOSIS — N05.5 MEMBRANOPROLIFERATIVE GLOMERULONEPHRITIS: ICD-10-CM

## 2020-05-29 LAB
ALBUMIN SERPL-MCNC: 4 G/DL (ref 3.5–5.2)
ALP BLD-CCNC: 85 U/L (ref 35–104)
ALT SERPL-CCNC: 82 U/L (ref 9–52)
ANION GAP SERPL CALCULATED.3IONS-SCNC: 10 MMOL/L (ref 7–19)
AST SERPL-CCNC: 54 U/L (ref 14–36)
BASOPHILS ABSOLUTE: 0.09 K/UL (ref 0.01–0.08)
BASOPHILS RELATIVE PERCENT: 0.3 % (ref 0.1–1.2)
BILIRUB SERPL-MCNC: 1.5 MG/DL (ref 0.2–1.3)
BUN BLDV-MCNC: 63 MG/DL (ref 7–17)
CALCIUM SERPL-MCNC: 8.7 MG/DL (ref 8.4–10.2)
CHLORIDE BLD-SCNC: 108 MMOL/L (ref 98–111)
CO2: 19 MMOL/L (ref 22–29)
CREAT SERPL-MCNC: 2.4 MG/DL (ref 0.5–1)
EOSINOPHILS ABSOLUTE: 0.54 K/UL (ref 0.04–0.54)
EOSINOPHILS RELATIVE PERCENT: 1.9 % (ref 0.7–7)
FERRITIN: 301 NG/ML (ref 6.2–137)
FOLATE: 6.6 NG/ML (ref 2.7–20)
GFR NON-AFRICAN AMERICAN: 21
GLOBULIN: 2.1 G/DL
GLUCOSE BLD-MCNC: 103 MG/DL (ref 74–106)
HCT VFR BLD CALC: 32 % (ref 34.1–44.9)
HEMOGLOBIN: 9.9 G/DL (ref 11.2–15.7)
IRON SATURATION: 20 % (ref 14–50)
IRON: 55 UG/DL (ref 37–170)
LACTATE DEHYDROGENASE: 1158 U/L (ref 313–618)
LYMPHOCYTES ABSOLUTE: 0.41 K/UL (ref 1.18–3.74)
LYMPHOCYTES RELATIVE PERCENT: 1.5 % (ref 19.3–53.1)
MCH RBC QN AUTO: 26.8 PG (ref 25.6–32.2)
MCHC RBC AUTO-ENTMCNC: 30.9 G/DL (ref 32.3–35.5)
MCV RBC AUTO: 86.5 FL (ref 79.4–94.8)
MONOCYTES ABSOLUTE: 2.75 K/UL (ref 0.24–0.82)
MONOCYTES RELATIVE PERCENT: 9.8 % (ref 4.7–12.5)
NEUTROPHILS ABSOLUTE: 24.15 K/UL (ref 1.56–6.13)
NEUTROPHILS RELATIVE PERCENT: 86.5 % (ref 34–71.1)
PDW BLD-RTO: 20.3 % (ref 11.7–14.4)
PLATELET # BLD: 173 K/UL (ref 182–369)
PMV BLD AUTO: 9.6 FL (ref 7.4–10.4)
POTASSIUM SERPL-SCNC: 4.3 MMOL/L (ref 3.5–5.1)
RBC # BLD: 3.7 M/UL (ref 3.93–5.22)
SODIUM BLD-SCNC: 137 MMOL/L (ref 137–145)
TOTAL IRON BINDING CAPACITY: 280 UG/DL (ref 265–497)
TOTAL PROTEIN: 6.1 G/DL (ref 6.3–8.2)
TSH SERPL DL<=0.05 MIU/L-ACNC: 2.72 UIU/ML (ref 0.47–4.68)
VITAMIN B-12: 386 PG/ML (ref 239–931)
WBC # BLD: 27.94 K/UL (ref 3.98–10.04)

## 2020-05-29 PROCEDURE — 82607 VITAMIN B-12: CPT

## 2020-05-29 PROCEDURE — 83615 LACTATE (LD) (LDH) ENZYME: CPT

## 2020-05-29 PROCEDURE — 99212 OFFICE O/P EST SF 10 MIN: CPT

## 2020-05-29 PROCEDURE — 83550 IRON BINDING TEST: CPT

## 2020-05-29 PROCEDURE — G8427 DOCREV CUR MEDS BY ELIG CLIN: HCPCS | Performed by: NURSE PRACTITIONER

## 2020-05-29 PROCEDURE — 83540 ASSAY OF IRON: CPT

## 2020-05-29 PROCEDURE — 1036F TOBACCO NON-USER: CPT | Performed by: NURSE PRACTITIONER

## 2020-05-29 PROCEDURE — 80053 COMPREHEN METABOLIC PANEL: CPT

## 2020-05-29 PROCEDURE — 84443 ASSAY THYROID STIM HORMONE: CPT

## 2020-05-29 PROCEDURE — 99205 OFFICE O/P NEW HI 60 MIN: CPT | Performed by: NURSE PRACTITIONER

## 2020-05-29 PROCEDURE — 77075 RADEX OSSEOUS SURVEY COMPL: CPT

## 2020-05-29 PROCEDURE — 82728 ASSAY OF FERRITIN: CPT

## 2020-05-29 PROCEDURE — G8417 CALC BMI ABV UP PARAM F/U: HCPCS | Performed by: NURSE PRACTITIONER

## 2020-05-29 PROCEDURE — 85025 COMPLETE CBC W/AUTO DIFF WBC: CPT

## 2020-05-29 PROCEDURE — 82746 ASSAY OF FOLIC ACID SERUM: CPT

## 2020-05-29 RX ORDER — ONDANSETRON 4 MG/1
4 TABLET, FILM COATED ORAL EVERY 8 HOURS PRN
COMMUNITY

## 2020-05-29 RX ORDER — CETIRIZINE HYDROCHLORIDE 10 MG/1
10 TABLET ORAL DAILY
COMMUNITY

## 2020-05-29 RX ORDER — LIDOCAINE 50 MG/G
1 PATCH TOPICAL DAILY
COMMUNITY

## 2020-05-29 RX ORDER — GABAPENTIN 100 MG/1
100 CAPSULE ORAL 3 TIMES DAILY
COMMUNITY

## 2020-05-29 RX ORDER — ROPINIROLE 1 MG/1
1 TABLET, FILM COATED ORAL NIGHTLY
COMMUNITY

## 2020-05-29 RX ORDER — FAMOTIDINE 40 MG/1
40 TABLET, FILM COATED ORAL DAILY
COMMUNITY

## 2020-05-29 RX ORDER — OMEPRAZOLE 20 MG/1
20 CAPSULE, DELAYED RELEASE ORAL DAILY
COMMUNITY

## 2020-05-29 RX ORDER — NIFEDIPINE 30 MG/1
30 TABLET, FILM COATED, EXTENDED RELEASE ORAL DAILY
COMMUNITY

## 2020-05-29 RX ORDER — METOPROLOL SUCCINATE 50 MG/1
50 TABLET, EXTENDED RELEASE ORAL DAILY
COMMUNITY

## 2020-05-29 RX ORDER — DAPSONE 100 MG/1
100 TABLET ORAL DAILY
COMMUNITY

## 2020-05-29 RX ORDER — LANOLIN ALCOHOL/MO/W.PET/CERES
3 CREAM (GRAM) TOPICAL DAILY
COMMUNITY

## 2020-05-29 RX ORDER — IBUPROFEN 200 MG
1 CAPSULE ORAL DAILY
COMMUNITY

## 2020-05-29 RX ORDER — RIZATRIPTAN BENZOATE 10 MG/1
10 TABLET ORAL DAILY PRN
COMMUNITY

## 2020-05-29 RX ORDER — FUROSEMIDE 40 MG/1
40 TABLET ORAL 2 TIMES DAILY
COMMUNITY

## 2020-05-29 NOTE — PROGRESS NOTES
OP HEMATOLOGY/ONCOLOGY CONSULTATION      Pt Name: Rhonda Port: 1970  MRN: [id number]  Referring provider: SOHAM Piedra  Requesting provider: Dr. Bridgette Chandler [facility])  Reason for consultation: MGUS  Date of evaluation: 5/31/2020    History Obtained From:  patient, electronic medical record    CHIEF COMPLAINT:    Chief Complaint   Patient presents with    New Patient     MGUS     HISTORY OF PRESENT ILLNESS:    Rebecca Morales is a 52 y.o.  female with a history of rheumatoid arthritis, Raynaud's and Sjogren's syndrome (treated in the past by rheumatologist, Dr. Selina Calhoun). She is seen in hematology consultation 5/29/2020 referred to the clinic by Dr. Bridgette Chandler at [facility] regarding MGUS. Davide Arora began to experience bouts of nausea/vomiting, dysphagia, epigastric pain and change in bowel habits with subsequent weight loss around April, 2020. She was also found to be anemic (Hgb 9.2). Outpatient work-up was initiated and she underwent endoscopy and colonoscopy by Dr. Meche Domingo in [place], [place] in April, 2020 (results requested for review). On 5/9/2020, Davide Arora presented to [facility] for complaint of shortness of breath and hypertension, found to be in acute CHF with acute renal failure. She was also found to be anemic (hgb 7. 1). Davide Arora was transferred to [facility], admitted 5/10/2020 with acute hypoxic respiratory failure due to pulmonary edema and superimposed CAP, hypertensive emergency, elevated troponin/d-dimer  and EZEKIEL. Davide Arora was then transferred to [facility] for further evaluation. She was followed by rheumatology, nephrology, pulmonology, internal medicine etc.    Brief summary of evaluation:  Baseline creatinine was recorded as 0.8 in 2018. Creatinine was 6.78 with GFR of 6 at presentation to [facility] 5/10/2020.      Imaging studies 5/10/2020 at [facility]:  · Echocardiogram: EF 45-50%, mild concentric hypertrophy, mild to moderate aortic and mitral valve regurgitation, left atrial cavity dilated, small (<1cm pericardial effusion without evidence of tamponade)   · Bilateral lower extremity venous Dopplers: Negative for DVT  · Renal ultrasound: Negative  · Noncontrast chest CT: Small to moderate pericardial effusion, mediastinal lymphadenopathy, 1.9 x 1.7 cm right paratracheal lymph node suspected hilar adenopathy, small bilateral pleural effusions and bilateral interstitial and alveolar infiltrates small-to-moderate pericardial effusion    VQ scan 5/19/2020 at [facility]: Negative for evidence of thromboembolic disease    Extensive serologic work-up included but not limited to:   · positive SSA, positive SCL-70 (47.3) and anticentromere ab, positive RF (52), positive cryoglobulin evaluation and negative ANCAs  · Hepatitis B and C negative, 5/10/2020  · HIV nonreactive, 5/11/2020  · SPEP, positive M spike of 0.7  · 24 hour UPEP, monoclonal band identified by immunofixation electrophoresis. Monoclonal IgM kappa, M-spike quantity too small to determine  · IgG 532, IgA 117, IgM 1340  · Kappa light chain 11.53, lambda light chain 6.02, K/L ratio 1.92  · Qualitative cryoglobulin, positive  · B2M, 20.2  · CRP, 81.3  · Sed rate, 83  · PPD negative  · LDH, 298 (125-220)  · Beta hCG quantitative, <2  · Peripheral blood smear, 1+ schistocytes, 1+ polychromasia and 1+ spherocytes.   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:  Normocellular marrow (50-60%) with maturing trilineage hematopoiesis, no evidence of B-cell neoplasm, plasma cell neoplasm or amyloid. There were no circulating blasts, plasma cells or abnormal lymphocytes. Cytogenetics showed a normal female karyotype. 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 Given the negative bone marrow biopsy, disease process was felt likely related to her rheumatologic disease and unlikely secondary to a hematologic malignancy. Monitoring for MGUS recommended. Regarding treatment, diuretics were given. Consensus at [facility] was not to perform PLEX. She required transfusional support. Prednisone 80 mg daily initiated 5/15/2020 then decreased to 60 mg daily on 5/29/2020. Rituxan 1 g was administered 5/20/20, next dose planned 6/3/2020 per rheumatologist, Dr. Lion Pagan. Pulmonary hypertension was felt less likely with recommendations for repeat echocardiogram as an outpatient and right heart cath should her clinical picture worsen.     Labs at discharge 5/20/2020:  · CBC: WBC 9.2, Hgb 9.4 with MCV 81, platelets [phone],339  · CMP: Creatinine 3.47, calcium 8.2, total protein 6.9      Past Medical History:   Diagnosis Date    Anemia     CHF (congestive heart failure) (HCC)     Chronic kidney disease     GERD (gastroesophageal reflux disease)     Heart palpitations     History of blood transfusion     Hypertension     Liver disease     fatty liver    RA (rheumatoid arthritis) ([facility] [address]     Raynaud's disease     Sjoegren syndrome      Past Surgical History:   Procedure Laterality Date    ADENOIDECTOMY      BONE MARROW BIOPSY  05/18/2020    at [address] Left 05/14/2020    at [address] [place] [address]         Current Outpatient Medications:     melatonin 3 MG TABS tablet, Take 3 mg by mouth daily, Disp: , Rfl:     gabapentin [Meloxicam] Hives    Penicillins Other (See Comments)    Plaquenil [Hydroxychloroquine] Diarrhea     Social History     Tobacco Use    Smoking status: Never Smoker    Smokeless tobacco: Never Used   Substance Use Topics    Alcohol use: No    Drug use: Not on file     Family History   Problem Relation Age of Onset    High Blood Pressure Mother     Heart Disease Father     High Blood Pressure Father     High Blood Pressure Brother     Heart Disease Maternal Grandmother     Stroke Maternal Grandmother     High Blood Pressure Maternal Grandmother     Heart Disease Maternal Grandfather     High Blood Pressure Maternal Grandfather     Heart Disease Paternal Grandmother     Stroke Paternal Grandmother     High Blood Pressure Paternal Grandmother     Heart Disease Paternal Grandfather     Stroke Paternal Grandfather     Cancer Paternal Grandfather     High Blood Pressure Paternal Grandfather      Health Maintenance   Topic Date Due    HIV screen  10/12/1985    DTaP/Tdap/Td vaccine (1 - Tdap) 10/12/1989    Cervical cancer screen  10/12/1991    Lipid screen  10/12/2010    Diabetes screen  10/12/2010    Flu vaccine (Season Ended) 09/01/2020    Potassium monitoring  05/29/2021    Creatinine monitoring  05/29/2021    Hepatitis A vaccine  Aged Out    Hepatitis B vaccine  Aged Out    Hib vaccine  Aged Out    Meningococcal (ACWY) vaccine  Aged Out    Pneumococcal 0-64 years Vaccine  Aged Out     Subjective   Review of Systems   Constitutional: Positive for fatigue and unexpected weight change (weight loss). Negative for fever. No night sweats   HENT: Negative for dental problem, hearing loss, mouth sores, nosebleeds, sore throat and trouble swallowing. Eyes: Negative for photophobia, discharge, redness and itching. No blurring of vision, no double vision   Respiratory: Negative for cough, shortness of breath and wheezing.          No hemoptysis   Cardiovascular: Negative for chest

## 2020-05-29 NOTE — TELEPHONE ENCOUNTER
Called and requested EGD and colonoscopy and last office visit from Dr Sharon Abbasi in [facility].  They are faxing to [phone]

## 2020-05-31 ASSESSMENT — ENCOUNTER SYMPTOMS
ABDOMINAL PAIN: 0
PHOTOPHOBIA: 0
SHORTNESS OF BREATH: 0
NAUSEA: 0
COUGH: 0
EYE DISCHARGE: 0
WHEEZING: 0
VOMITING: 0
TROUBLE SWALLOWING: 0
EYE ITCHING: 0
DIARRHEA: 0
CONSTIPATION: 0
EYE REDNESS: 0
SORE THROAT: 0

## 2020-06-01 ENCOUNTER — TELEPHONE (OUTPATIENT)
Dept: HEMATOLOGY | Age: 50
End: 2020-06-01

## 2020-06-05 ENCOUNTER — OUTSIDE FACILITY SERVICE (OUTPATIENT)
Dept: CARDIOLOGY | Facility: CLINIC | Age: 50
End: 2020-06-05

## 2020-06-05 PROCEDURE — 93010 ELECTROCARDIOGRAM REPORT: CPT | Performed by: INTERNAL MEDICINE

## 2020-06-08 ENCOUNTER — TELEPHONE (OUTPATIENT)
Dept: CARDIOLOGY | Age: 50
End: 2020-06-08

## 2020-06-08 NOTE — TELEPHONE ENCOUNTER
No Answer; Unable to confirm appt; Pt was notified to wear a mask; Pt was notified to not arrive for her appt no more thatn 5 minutes prior to her appt as there will be no seating in the lobby. Pt was notified that we are only allowing one visitor with the pt.